# Patient Record
Sex: FEMALE | Race: WHITE | NOT HISPANIC OR LATINO | Employment: OTHER | URBAN - METROPOLITAN AREA
[De-identification: names, ages, dates, MRNs, and addresses within clinical notes are randomized per-mention and may not be internally consistent; named-entity substitution may affect disease eponyms.]

---

## 2019-07-16 ENCOUNTER — HOSPITAL ENCOUNTER (EMERGENCY)
Facility: HOSPITAL | Age: 64
Discharge: HOME | End: 2019-07-16
Payer: MEDICARE

## 2019-07-16 VITALS
DIASTOLIC BLOOD PRESSURE: 87 MMHG | RESPIRATION RATE: 20 BRPM | HEIGHT: 66 IN | HEART RATE: 90 BPM | BODY MASS INDEX: 21.86 KG/M2 | SYSTOLIC BLOOD PRESSURE: 146 MMHG | WEIGHT: 136 LBS | OXYGEN SATURATION: 97 % | TEMPERATURE: 98 F

## 2019-07-16 DIAGNOSIS — S00.03XA CONTUSION OF SCALP, INITIAL ENCOUNTER: ICD-10-CM

## 2019-07-16 DIAGNOSIS — S06.0X0A CONCUSSION WITHOUT LOSS OF CONSCIOUSNESS, INITIAL ENCOUNTER: Primary | ICD-10-CM

## 2019-07-16 PROCEDURE — 99284 EMERGENCY DEPT VISIT MOD MDM: CPT | Mod: 25

## 2019-07-16 PROCEDURE — 70450 CT HEAD WITHOUT CONTRAST: ICD-10-PCS | Mod: 26,,, | Performed by: RADIOLOGY

## 2019-07-16 PROCEDURE — 70450 CT HEAD/BRAIN W/O DYE: CPT | Mod: 26,,, | Performed by: RADIOLOGY

## 2019-07-16 PROCEDURE — 70450 CT HEAD/BRAIN W/O DYE: CPT | Mod: TC

## 2019-07-16 NOTE — DISCHARGE INSTRUCTIONS
Your blood pressure was elevated on exam today please follow up with pcp for blood pressure management.     May take OTC tylenol and needed for pain. Avoid NSAID containing products for 24 hours after the injury. May apply ice 15 min three times a day as needed for pain.    Follow up with PCP in 24-48 hours for reevaluation.

## 2019-07-16 NOTE — ED PROVIDER NOTES
Encounter Date: 7/16/2019       History     Chief Complaint   Patient presents with    Fall    Head Injury     Pt presents to the ER with dizziness post fall. Pt stated she was working at her house and tripped on a piece of raised concrete and hit her head on the water heater. Pt stated she was having a hard time walking for 1 hour post fall and stated now she can walk normal but stated she still has dizziness and on the spot where she hit her head she has pain. Pt stated she is also sore to her back denied radiation of pain denied having taken any medication for pain prior to arrival and stated she did not want anything for pain at this time. Pt stated she has not had this type of injury before.    The history is provided by the patient.     Review of patient's allergies indicates:  No Known Allergies  History reviewed. No pertinent past medical history.  Past Surgical History:   Procedure Laterality Date    APPENDECTOMY      CHOLECYSTECTOMY       History reviewed. No pertinent family history.  Social History     Tobacco Use    Smoking status: Never Smoker   Substance Use Topics    Alcohol use: Yes     Comment: occ    Drug use: Never     Review of Systems   Constitutional: Negative for fever.   HENT: Negative for sore throat.    Respiratory: Negative for shortness of breath.    Cardiovascular: Negative for chest pain.   Gastrointestinal: Negative for nausea.   Genitourinary: Negative for dysuria.   Musculoskeletal: Positive for back pain (Descried as sore), gait problem (Resolved prior to arrival in ER) and neck pain (Pain to both sides fo neck). Negative for neck stiffness.        Pain to right side of scalp    Skin: Negative for rash.   Neurological: Negative for weakness.   Hematological: Does not bruise/bleed easily.   All other systems reviewed and are negative.      Physical Exam     Initial Vitals [07/16/19 1137]   BP Pulse Resp Temp SpO2   (!) 146/87 90 20 98.3 °F (36.8 °C) 97 %      MAP       --          Physical Exam    Nursing note and vitals reviewed.  Constitutional: She appears well-developed and well-nourished. She is not diaphoretic. No distress.   HENT:   Head: Normocephalic and atraumatic.       Right Ear: External ear normal.   Left Ear: External ear normal.   Nose: Nose normal.   Mouth/Throat: Oropharynx is clear and moist. No oropharyngeal exudate.   No septal hematoma   Eyes: Conjunctivae and EOM are normal. Pupils are equal, round, and reactive to light. Right eye exhibits no discharge. Left eye exhibits no discharge.   Neck: Normal range of motion. Neck supple.   Cardiovascular: Normal rate, regular rhythm, normal heart sounds and intact distal pulses. Exam reveals no gallop and no friction rub.    No murmur heard.  Pulmonary/Chest: Breath sounds normal. No respiratory distress. She has no wheezes. She has no rhonchi. She has no rales. She exhibits no tenderness.   Musculoskeletal: Normal range of motion. She exhibits tenderness.   Strength 5/5 BUE and BLE   Neurological: She is alert and oriented to person, place, and time. She has normal strength. No cranial nerve deficit or sensory deficit. Coordination and gait normal. GCS score is 15. GCS eye subscore is 4. GCS verbal subscore is 5. GCS motor subscore is 6.   Skin: Skin is warm and dry. Capillary refill takes less than 2 seconds.   Psychiatric: She has a normal mood and affect. Thought content normal.         ED Course   Procedures  Labs Reviewed - No data to display       Imaging Results    None          Medical Decision Making:   Differential Diagnosis:   Concussion Contusion sprain strain closed head injury  Clinical Tests:   Radiological Study: Ordered and Reviewed  ED Management:  Due to patient being 63 [place] head CT criteria not able to rule out need for head CT will order CT head without contrast. Pt refused pain medication on initial assessment.    Discussed RTER precautions with the patient stated she understood and did not have  any questions.                      Clinical Impression:       ICD-10-CM ICD-9-CM   1. Concussion without loss of consciousness, initial encounter S06.0X0A 850.0   2. Contusion of scalp, initial encounter S00.03XA 920                                SANGEETA Owen  07/18/19 1224

## 2019-12-20 DIAGNOSIS — M54.2 NECK PAIN: Primary | ICD-10-CM

## 2020-01-06 ENCOUNTER — CLINICAL SUPPORT (OUTPATIENT)
Dept: REHABILITATION | Facility: HOSPITAL | Age: 65
End: 2020-01-06
Payer: MEDICARE

## 2020-01-06 DIAGNOSIS — M54.2 NECK PAIN, CHRONIC: Primary | ICD-10-CM

## 2020-01-06 DIAGNOSIS — M25.512 CHRONIC PAIN OF BOTH SHOULDERS: ICD-10-CM

## 2020-01-06 DIAGNOSIS — G89.29 CHRONIC PAIN OF BOTH SHOULDERS: ICD-10-CM

## 2020-01-06 DIAGNOSIS — G89.29 NECK PAIN, CHRONIC: Primary | ICD-10-CM

## 2020-01-06 DIAGNOSIS — M25.511 CHRONIC PAIN OF BOTH SHOULDERS: ICD-10-CM

## 2020-01-06 PROCEDURE — 97162 PT EVAL MOD COMPLEX 30 MIN: CPT | Mod: PN

## 2020-01-06 PROCEDURE — 97110 THERAPEUTIC EXERCISES: CPT | Mod: PN

## 2020-01-06 NOTE — PLAN OF CARE
[facility]  Physical Therapy Initial Evaluation    Name: Tracy Meyer  Clinic Number: [id number]    Therapy Diagnosis:   Encounter Diagnoses   Name Primary?    Neck pain, chronic Yes    Chronic pain of both shoulders      Physician: Nila Leung MD    Physician Orders: PT Eval and Treat   Medical Diagnosis: Neck Pain   Evaluation Date: 1/6/2020  Authorization period Expiration: 12/31/2020  Plan of Care Certification Period: 4/1/2020    Visit #: 1/ Visits authorized: 12  Time In:7:00 AM   Time Out: 8:00 AM   Total Billable Time: 60 minutes    Precautions: Standard    Subjective   Date of onset: December 2019   Date of Surgery: N/A     No past medical history on file. - history of depression; osteopenia; prior history of neck and shoulder pain with bilateral adhesive capsulitis   Tracy Meyer  has a past surgical history that includes Appendectomy and Cholecystectomy.    Tracy currently has no medications in their medication list.    Review of patient's allergies indicates:  No Known Allergies     Imaging, : no imaging noted     Prior Therapy: Tracy reported prior physical therapy treatment  for neck and shoulder pain; had dry needling also which she reports was helpful.   Social History: Patient lives in a single level home with 1 steps to enter   Occupation: retired Nurse Practitioner   Prior Level of Function: Independent PLOF; always limited cervical range of motion, but not as painful as it has been,   Current Level of Function: reports having increased problems with her right shoulder and arm - tingling in her 3rd,4th,5th fingers - elbow pain; thinks maybe it is carpal tunnel. She stated that she does her exercises everyday to keep her thoracic spine moving, still playing golf, but having trouble holding onto the golf club with her right hand; feels like she doesn't have the range in her back swing with her right shoulder.     Pain:  Current 6/10, worst 10/10, best 5/10   Location:  neck  right  Description: Burning, Tingling and Shooting  Aggravating Factors: moving my neck; performing my daily activities, playing golf   Easing Factors: rest      Onset/LAURA: gradual , although reports significant past history of neck, thoracic and shoulder pain     History of current condition - Tracy reports: recent exacerbation of neck, thoracic and right shoulder pain - about 1 month in duration; Tracy stated that she is having constant neck and bilateral upper trap pain; tingling into her right hand ( this is not new) but she feels like it is getting more frequent; Tracy wants to improve her range of motion, decrease her pain and get back to improved function.     Pts goals: To decrease pain and improve range of motion.         Objective     Observation: Tracy ambulated into clinic; upset over how long it took her to get a referral for therapy; stated she has been hurting for well over a month. Tracy is alert/oriented x 4; she reports stiffness in her neck, shoulders - history of frozen shoulders bilaterally     Posture:     -       flattend thoracic spine noted; slight rounding of shoulders     Cervical Range of Motion:    Degrees Pain   Flexion 21 Discomfort    Extension 12 Pain    Right Rotation 30 Pain    Left Rotation 41    Right Side Bending 10    Left Side Bending 6 Pain       Shoulder Range of Motion:   Shoulder Left Right   Flexion 160 160    Abduction 160 160   ER HBH to T3 HBH to T3   IR TUB to T8 TUB to T8     Strength:   Left Right   DNF Poor - less than 10 secs     Upper trap 4-/5 4-/5   Mid trap 3/5 3/5   Lower trap 3/5 3/5   Rhomboids 3+/5 3+/5    45,43,40 35,40,35     Upper Extremity Strength  (R) UE  (L) UE    Shoulder flexion: 5/5 Shoulder flexion: 5/5   Shoulder Abduction: 4/5 Shoulder abduction: 4/5   Shoulder ER 4+/5 Shoulder ER 4+/5   Shoulder IR 5/5 Shoulder IR 5/5   Elbow flexion: 5/5 Elbow flexion: 5/5   Elbow extension: 5/5 Elbow extension: 5/5   Wrist flexion: 4+/5 Wrist  flexion: 4+/5   Wrist extension: 4+/5 Wrist extension: 4+/5       Special Tests:  Distraction Negative   Compression Positive   Spurlings Negative   Sharp-Kasandra Negative   VA test Negative   Lateral Flexion Alar Ligament Negative     Upper Limb Neurodynamic testing:   Right  Left   BPNT Negative  Negative   UNT Negative  Negative   MNT Negative  Negative   RNT Negative  Negative         Joint Mobility:  Cervical - ,    PA's decreased,    Transverse glides decreased - significant loss of cervical mobility noted - especially OA extension; Right rotation, lateral SB to left     Thoracic mobility: restricted into extension and rotation     Palpation: moderate tenderness to palpation at bilateral upper traps; thoracic paraspinals     Sensation: intact to light touch; tingling in 3-5 fingers right hand     Flexibility:     Upper Trap = R moderate restriction, L moderate restriction   Scalenes: R moderate restriction, L moderate restriction   SCM: R minimal restriction, L minimal restriction   Levator Scap: R minimal restriction, L minimal restriction      PT Evaluation Completed? Yes  Discussed Plan of Care with patient: Yes    TREATMENT   Tracy received therapeutic exercises to develop ROM and flexibility for 15 minutes including:    Using 1/2 roll or smaller foam roller - horizontal at mid scapular - extend thoracic over this and perform bilateral shoulder flexion to open thoracic segments;   OA flexion/etxtension with head lift for upper segment motion and some early strengthening of DNF  Pectoralis and upper trap stretching - proper technique     Home Exercises and Patient Education Provided    Education provided re:   - progress towards goals   - role of therapy in multi - disciplinary team, goals for therapy  Pt educated on condition, POC, and expectations in therapy.  No spiritual or educational barriers to learning provided    Home exercises:  Pt will be provided HEP during course of treatment with progressions  as appropriate. Pt was advised to perform these exercises free of pain, and to stop performing them if pain occurs.   Tracy demonstrated good  understanding of the education provided.       Functional Limitations Reports - G Codes  Category: Mobility, Body position, Carrying, Self care, Other  Tool: UEFS and Neck Disability Index  Score: both are at 52% limitation       Assessment   Tracy is a 64 y.o. female referred to outpatient physical therapy and presents to PT with Cervical and upper thoracic pain.  Tracy has a chronic history of neck pain and loss of ROM; She has had Physical Therapy treatment in the past - just moved from [place] to [place] about 5 months ago and is in need of therapy again.  She demonstrates significant weakness in her deep neck flexor muscles and posterior scapula mm along with a loss of flexibility in her neck and upper back.  Patient demonstrates limitations as described in the problem list. Pt will benefit from physcial therapy services in order to maximize pain free and/or functional use of bilateral UE's and cervical/thoracic spine movement so that she can return to improved daily and recreational activities. . The following goals were discussed with the patient and patient is in agreement with them as to be addressed in the treatment plan.     Pt prognosis is Good.   Pt will benefit from skilled outpatient Physical Therapy to address the deficits stated above and in the chart below, provide pt/family education, and to maximize pt's level of independence.     Plan of care discussed with patient: Yes  Pt's spiritual, cultural and educational needs considered and pt agreeable to plan of care and goals as stated below:     Anticipated Barriers for therapy: none    Medical necessity is demonstrated by the following IMPAIRMENTS/PROBLEM LIST:    weakness, impaired sensation, impaired functional mobility, decreased upper extremity function, pain, decreased ROM, impaired joint  extensibility and impaired muscle length    GOALS:     Long Term Goals: 6 weeks  Pain: Decrease pain to no more than 3/10 to allow for improved ability to perform daily and recreational activities   Strength: Improve strength in deep neck flexor mm from able to hold x 10 secs to at least 20 secs and improve posterior scapula mm strength to at least 4+/5 for improved cervical stability  ROM: Improve ROM to 60% of normal limits   Functional scale: Improve score on UEFS and Neck disability index to <35% limitation   Lifting: Lift 25 lbs to waist level, 15 lbs to shoulder level, 10 lbs to overhead without pain or compensation  Postures: Increase sitting and/or standing duration to 60 mins  without pain   Transfers: Perform sit <> Stand and supine <> sit  transfers without increased pain or limitation  Exercise: demonstrate independence with home exercise program to maintain gains made in therapy.          Plan   Certification Period: 1/6/2020 to 4/6/2020.    Outpatient physical therapy 2 times weekly to include: Manual Therapy, Neuromuscular Re-ed, Patient Education and Therapeutic Exercise. Cont PT for 6 weeks.   Pt may be seen by PTA as part of the rehabilitation team.     I certify the need for these services furnished under this plan of treatment and while under my care.    Alexandra Mitchell, PT          Attestation:   I have seen the patient, reviewed the therapist's plan of care, and I agree with the plan of care.   I certify the need for these services furnished under this plan of treatment and while under my care.         _______________            ________                                               _____________________  Physician/Referring Practitioner                                                            Date of Signature

## 2020-01-10 ENCOUNTER — CLINICAL SUPPORT (OUTPATIENT)
Dept: REHABILITATION | Facility: HOSPITAL | Age: 65
End: 2020-01-10
Payer: MEDICARE

## 2020-01-10 DIAGNOSIS — G89.29 NECK PAIN, CHRONIC: Primary | ICD-10-CM

## 2020-01-10 DIAGNOSIS — M25.512 CHRONIC PAIN OF BOTH SHOULDERS: ICD-10-CM

## 2020-01-10 DIAGNOSIS — M54.2 NECK PAIN, CHRONIC: Primary | ICD-10-CM

## 2020-01-10 DIAGNOSIS — G89.29 CHRONIC PAIN OF BOTH SHOULDERS: ICD-10-CM

## 2020-01-10 DIAGNOSIS — M25.511 CHRONIC PAIN OF BOTH SHOULDERS: ICD-10-CM

## 2020-01-10 PROCEDURE — 97140 MANUAL THERAPY 1/> REGIONS: CPT | Mod: PN

## 2020-01-10 PROCEDURE — 97110 THERAPEUTIC EXERCISES: CPT | Mod: PN

## 2020-01-10 NOTE — PROGRESS NOTES
"                                                    Physical Therapy Daily Note     Name: Tracy Meyer  Clinic Number: [id number]  Diagnosis:   Encounter Diagnoses   Name Primary?    Neck pain, chronic Yes    Chronic pain of both shoulders      Physician: Nila Leung MD  Precautions: standard  Visit #: 2 of 12  PTA Visit #: 0  Time In: 7:00 AM   Time Out: 8:00 AM     Subjective     Pt reports: "I'm definitely better, I feel like I have more mobility"    Pain Scale: Tracy rates pain on a scale of 0-10 to be 4-5 currently.    Objective     Tracy received individual therapeutic exercises to develop strength, ROM, posture and core stabilization for 30 minutes including:  Chin tucks   Prone - thoracic/lumbar extension x 10  Prone - alternate arms x 10  Prone - "w's" x 10  Prone - bilateral shoulder abduction x 10  Prone - bilateral shoulder extension x 10 (palms up)  Supine - bilateral shoulder flexion over 1/2 roll x 15  Supine: scapular retractions - lying on 1/2 roll vertically   Supine - serratus lifts - vertical 1/2 roll     Tracy received the following manual therapy techniques: Joint mobilizations, Manual traction and Soft tissue Mobilization were applied to the: cervical and thoracic spine  for 30 minutes including:  Seated - STM to bilateral Upper traps with trigger point release on right ; Seated - MET to improve right cervical rotation and extension 4 reps ; Supine - cervical manual traction; OA extension; Facet mobilization from mid-tspine to cervical; Lateral gliding of C1/C2 to left for right rotation;  Prone - STM with tissue lifting bilateral upper traps and paraspinals through thoracic area;  Grade II mobilization into extension from T1 through T8;       Written Home Exercises Provided:   Tracy was given HEP on evaluation   Pt demo good understanding of the education provided. Tracy demonstrated good return demonstration of activities.     Education provided re:  Tracy verbalized good understanding of " education provided.   No spiritual or educational barriers to learning provided    Assessment     Patient tolerated treatment well; better able to turn head from side to side without increased pain;   This is a 64 y.o. female referred to outpatient physical therapy and presents with a medical diagnosis of cervicalgia and demonstrates limitations as described in the problem list. Pt prognosis is Good. Pt will continue to benefit from skilled outpatient physical therapy to address the deficits listed in the problem list, provide pt/family education and to maximize pt's level of independence in the home and community environment.     Goals as follows:    Long Term Goals: 6 weeks  Pain: Decrease pain to no more than 3/10 to allow for improved ability to perform daily and recreational activities   Strength: Improve strength in deep neck flexor mm from able to hold x 10 secs to at least 20 secs and improve posterior scapula mm strength to at least 4+/5 for improved cervical stability  ROM: Improve ROM to 60% of normal limits   Functional scale: Improve score on UEFS and Neck disability index to <35% limitation   Lifting: Lift 25 lbs to waist level, 15 lbs to shoulder level, 10 lbs to overhead without pain or compensation  Postures: Increase sitting and/or standing duration to 60 mins  without pain   Transfers: Perform sit <> Stand and supine <> sit  transfers without increased pain or limitation  Exercise: demonstrate independence with home exercise program to maintain gains made in therapy.          Plan     Continue with established Plan of Care towards PT goals.    Therapist: Alexandra Mitchell, PT  1/10/2020

## 2020-01-13 ENCOUNTER — CLINICAL SUPPORT (OUTPATIENT)
Dept: REHABILITATION | Facility: HOSPITAL | Age: 65
End: 2020-01-13
Payer: MEDICARE

## 2020-01-13 DIAGNOSIS — M54.2 NECK PAIN, CHRONIC: Primary | ICD-10-CM

## 2020-01-13 DIAGNOSIS — M25.512 CHRONIC PAIN OF BOTH SHOULDERS: ICD-10-CM

## 2020-01-13 DIAGNOSIS — G89.29 CHRONIC PAIN OF BOTH SHOULDERS: ICD-10-CM

## 2020-01-13 DIAGNOSIS — M25.511 CHRONIC PAIN OF BOTH SHOULDERS: ICD-10-CM

## 2020-01-13 DIAGNOSIS — G89.29 NECK PAIN, CHRONIC: Primary | ICD-10-CM

## 2020-01-13 PROCEDURE — 97140 MANUAL THERAPY 1/> REGIONS: CPT | Mod: PN

## 2020-01-13 PROCEDURE — 97010 HOT OR COLD PACKS THERAPY: CPT | Mod: PN

## 2020-01-13 NOTE — PROGRESS NOTES
"                                                    Physical Therapy Daily Note     Name: Tracy Meyer  Clinic Number: [id number]  Diagnosis:   Encounter Diagnoses   Name Primary?    Neck pain, chronic Yes    Chronic pain of both shoulders      Physician: Nila Leung MD  Precautions: standard  Visit #: 3 of 12  PTA Visit #: 0  Time In: 7:00 AM   Time Out: 8: 20 AM     Subjective     Pt reports: "I'm really hurting today, I have a lot of burring from my right ear down into my shoulder blade"   I started hurting on Friday evening after I left and it didn't really let up until Sunday.  I figured out that when I'm doing my chin tucks, I'm clenching my teeth and I think this is causing some of my pain on the right side.   Pain Scale: Tracy rates pain on a scale of 0-10 to be 8 currently.    Objective     Tracy received individual therapeutic exercises to develop strength, ROM, posture and core stabilization for 30 minutes including:  Chin tucks   S/L thoracic stretches   Prone - thoracic/lumbar extension x 10  Prone - alternate arms x 10 - did not perform   Prone - "w's" x 10  Prone - bilateral shoulder abduction x 10  Prone - bilateral shoulder extension x 10 (palms up)  Supine - bilateral shoulder flexion over 1/2 roll x 15 - used smaller blue roll   Supine: scapular retractions - lying on 1/2 roll vertically - used smaller blue roll   Supine - serratus lifts - vertical 1/2 roll  - used smaller blue roll     Tracy received the following manual therapy techniques: Joint mobilizations, Manual traction and Soft tissue Mobilization were applied to the: cervical and thoracic spine  for 30 minutes including:  Seated - IASTM to bilateral Upper traps with trigger point release on right ; Supine - cervical manual traction; OA extension; Facet mobilization from mid-tspine to cervical; Lateral gliding of C1/C2 to left for right rotation;  Prone - STM with tissue lifting bilateral upper traps and paraspinals through thoracic " area;  Grade II mobilization into extension from T1 through T8;     Provided moist heat to bilateral upper traps, neck and posterior shoulder/scapula areas following manual treatment today x 18 mins.     Written Home Exercises Provided:   Tracy was given HEP on evaluation - concentrate on neck retractions - put tongue on roof of mouth to prevent clenching teeth; S/L thoracic stretches; Prone abductions and extensions - no flexion in prone right now.   Pt demo good understanding of the education provided. Tracy demonstrated good return demonstration of activities.     Education provided re:  Tracy verbalized good understanding of education provided.   No spiritual or educational barriers to learning provided    Assessment     Patient tolerated treatment well; focused on fascia release and tissue stretching today; ROM exercises without any weight.   This is a 64 y.o. female referred to outpatient physical therapy and presents with a medical diagnosis of cervicalgia and demonstrates limitations as described in the problem list. Pt prognosis is Good. Pt will continue to benefit from skilled outpatient physical therapy to address the deficits listed in the problem list, provide pt/family education and to maximize pt's level of independence in the home and community environment.     Goals as follows:    Long Term Goals: 6 weeks  Pain: Decrease pain to no more than 3/10 to allow for improved ability to perform daily and recreational activities   Strength: Improve strength in deep neck flexor mm from able to hold x 10 secs to at least 20 secs and improve posterior scapula mm strength to at least 4+/5 for improved cervical stability  ROM: Improve ROM to 60% of normal limits   Functional scale: Improve score on UEFS and Neck disability index to <35% limitation   Lifting: Lift 25 lbs to waist level, 15 lbs to shoulder level, 10 lbs to overhead without pain or compensation  Postures: Increase sitting and/or standing duration to  60 mins  without pain   Transfers: Perform sit <> Stand and supine <> sit  transfers without increased pain or limitation  Exercise: demonstrate independence with home exercise program to maintain gains made in therapy.          Plan     Continue with established Plan of Care towards PT goals.    Therapist: Alexandra Mitchell, PT  1/13/2020

## 2020-01-17 ENCOUNTER — CLINICAL SUPPORT (OUTPATIENT)
Dept: REHABILITATION | Facility: HOSPITAL | Age: 65
End: 2020-01-17
Payer: MEDICARE

## 2020-01-17 DIAGNOSIS — M54.2 NECK PAIN, CHRONIC: Primary | ICD-10-CM

## 2020-01-17 DIAGNOSIS — M25.512 CHRONIC PAIN OF BOTH SHOULDERS: ICD-10-CM

## 2020-01-17 DIAGNOSIS — G89.29 CHRONIC PAIN OF BOTH SHOULDERS: ICD-10-CM

## 2020-01-17 DIAGNOSIS — G89.29 NECK PAIN, CHRONIC: Primary | ICD-10-CM

## 2020-01-17 DIAGNOSIS — M25.511 CHRONIC PAIN OF BOTH SHOULDERS: ICD-10-CM

## 2020-01-17 PROCEDURE — 97110 THERAPEUTIC EXERCISES: CPT | Mod: PN

## 2020-01-17 PROCEDURE — 97140 MANUAL THERAPY 1/> REGIONS: CPT | Mod: PN

## 2020-01-17 NOTE — PROGRESS NOTES
"                                                    Physical Therapy Daily Note     Name: Tracy Meyer  Clinic Number: [id number]  Diagnosis:   Encounter Diagnoses   Name Primary?    Chronic pain of both shoulders     Neck pain, chronic Yes     Physician: Nila Leung MD  Precautions: standard  Visit #: 4 of 12  PTA Visit #: 0  Time In: 7:00 AM   Time Out: 8:00 AM     Subjective      Pt reports:  "I'm so much better this morning, I just have alittle pain on the right side of my head into my shoulder blade   Pain Scale: Tracy rates pain on a scale of 0-10 to be 3-4 currently.    Objective     Tracy received individual therapeutic exercises to develop strength, ROM, posture and core stabilization for 30 minutes including:  Chin tucks   S/L thoracic stretches   Prone - thoracic/lumbar extension x 10  Prone - alternate arms x 10   Prone - "w's" x 10  Prone - bilateral shoulder abduction x 10  Prone - bilateral shoulder extension x 10 (palms up)  Supine - bilateral shoulder flexion over 1/2 roll x 15 - used smaller blue roll   Supine: scapular retractions - lying on 1/2 roll vertically - used smaller blue roll   Supine - serratus lifts - vertical 1/2 roll  - used smaller blue roll     Tracy received the following manual therapy techniques: Joint mobilizations, Manual traction and Soft tissue Mobilization were applied to the: cervical and thoracic spine  for 30 minutes including:  Seated - IASTM to bilateral Upper traps with trigger point release on right ; Supine - cervical manual traction; OA extension; Facet mobilization from mid-tspine to cervical; Lateral gliding of C1/C2 to left for right rotation;  Prone - STM with tissue lifting bilateral upper traps and paraspinals through thoracic area;  Grade II mobilization into extension from T1 through T8;     Provided moist heat to bilateral upper traps, neck and posterior shoulder/scapula areas following manual treatment today x 18 mins.     Written Home Exercises " Provided:   Tracy was given HEP on evaluation - concentrate on neck retractions - put tongue on roof of mouth to prevent clenching teeth; S/L thoracic stretches; Prone abductions and extensions - no flexion in prone right now.   Pt demo good understanding of the education provided. Tracy demonstrated good return demonstration of activities.     Education provided re:  Tracy verbalized good understanding of education provided.   No spiritual or educational barriers to learning provided    Assessment     Patient tolerated treatment well; focused on fascia release and tissue stretching today; ROM exercises without any weight.   This is a 64 y.o. female referred to outpatient physical therapy and presents with a medical diagnosis of cervicalgia and demonstrates limitations as described in the problem list. Pt prognosis is Good. Pt will continue to benefit from skilled outpatient physical therapy to address the deficits listed in the problem list, provide pt/family education and to maximize pt's level of independence in the home and community environment.     Goals as follows:    Long Term Goals: 6 weeks  Pain: Decrease pain to no more than 3/10 to allow for improved ability to perform daily and recreational activities   Strength: Improve strength in deep neck flexor mm from able to hold x 10 secs to at least 20 secs and improve posterior scapula mm strength to at least 4+/5 for improved cervical stability  ROM: Improve ROM to 60% of normal limits   Functional scale: Improve score on UEFS and Neck disability index to <35% limitation   Lifting: Lift 25 lbs to waist level, 15 lbs to shoulder level, 10 lbs to overhead without pain or compensation  Postures: Increase sitting and/or standing duration to 60 mins  without pain   Transfers: Perform sit <> Stand and supine <> sit  transfers without increased pain or limitation  Exercise: demonstrate independence with home exercise program to maintain gains made in therapy.           Plan     Continue with established Plan of Care towards PT goals.    Therapist: Alexandra Mitchell, PT  1/17/2020

## 2020-01-20 ENCOUNTER — CLINICAL SUPPORT (OUTPATIENT)
Dept: REHABILITATION | Facility: HOSPITAL | Age: 65
End: 2020-01-20
Payer: MEDICARE

## 2020-01-20 DIAGNOSIS — G89.29 NECK PAIN, CHRONIC: Primary | ICD-10-CM

## 2020-01-20 DIAGNOSIS — M25.512 CHRONIC PAIN OF BOTH SHOULDERS: ICD-10-CM

## 2020-01-20 DIAGNOSIS — G89.29 CHRONIC PAIN OF BOTH SHOULDERS: ICD-10-CM

## 2020-01-20 DIAGNOSIS — M25.511 CHRONIC PAIN OF BOTH SHOULDERS: ICD-10-CM

## 2020-01-20 DIAGNOSIS — M54.2 NECK PAIN, CHRONIC: Primary | ICD-10-CM

## 2020-01-20 PROCEDURE — 97110 THERAPEUTIC EXERCISES: CPT | Mod: PN

## 2020-01-20 PROCEDURE — 97140 MANUAL THERAPY 1/> REGIONS: CPT | Mod: PN

## 2020-01-20 NOTE — PROGRESS NOTES
"                                                    Physical Therapy Daily Note     Name: Tracy Meyer  Clinic Number: [id number]  Diagnosis:   Encounter Diagnoses   Name Primary?    Neck pain, chronic Yes    Chronic pain of both shoulders      Physician: Nila Leung MD  Precautions: standard  Visit #: 5 of 12  PTA Visit #: 0  Time In:  10:00 AM    Time Out: 11:00 AM     Subjective      Pt reports:  "I'm so much better this morning, I just have a little pain at the top of my shoulder running into my head, but nothing too bad.   Pain Scale: Tracy rates pain on a scale of 0-10 to be 3-4 currently.    Objective     Tracy received individual therapeutic exercises to develop strength, ROM, posture and core stabilization for 30 minutes including:  Chin tucks   S/L thoracic stretches   Prone - thoracic/lumbar extension x 10  Prone - alternate arms x 10   Prone - "w's" x 10  Prone - bilateral shoulder abduction x 10  Prone - bilateral shoulder extension x 10 (palms up)  Supine - bilateral shoulder flexion over 1/2 roll x 15 - used smaller blue roll   Supine: scapular retractions - lying on 1/2 roll vertically - used smaller blue roll   Supine - serratus lifts - vertical 1/2 roll  - used smaller blue roll     Tracy received the following manual therapy techniques: Joint mobilizations, Manual traction and Soft tissue Mobilization were applied to the: cervical and thoracic spine  for 30 minutes including:  Seated - IASTM to bilateral Upper traps with trigger point release on right ; Supine - cervical manual traction; OA extension; Facet mobilization from mid-tspine to cervical; Lateral gliding of C1/C2 to left for right rotation;  Prone - STM with tissue lifting bilateral upper traps and paraspinals through thoracic area;  Grade II mobilization into extension from T1 through T8;         Written Home Exercises Provided:   Tracy was given HEP on evaluation - concentrate on neck retractions - put tongue on roof of mouth to " prevent clenching teeth; S/L thoracic stretches; Prone abductions and extensions - no flexion in prone right now.   Pt demo good understanding of the education provided. Tracy demonstrated good return demonstration of activities.     Education provided re:  Tracy verbalized good understanding of education provided.   No spiritual or educational barriers to learning provided    Assessment     Patient tolerated treatment well; focused on fascia release and tissue stretching today; ROM exercises without any weight.   This is a 64 y.o. female referred to outpatient physical therapy and presents with a medical diagnosis of cervicalgia and demonstrates limitations as described in the problem list. Pt prognosis is Good. Pt will continue to benefit from skilled outpatient physical therapy to address the deficits listed in the problem list, provide pt/family education and to maximize pt's level of independence in the home and community environment.     Goals as follows:    Long Term Goals: 6 weeks  Pain: Decrease pain to no more than 3/10 to allow for improved ability to perform daily and recreational activities   Strength: Improve strength in deep neck flexor mm from able to hold x 10 secs to at least 20 secs and improve posterior scapula mm strength to at least 4+/5 for improved cervical stability  ROM: Improve ROM to 60% of normal limits   Functional scale: Improve score on UEFS and Neck disability index to <35% limitation   Lifting: Lift 25 lbs to waist level, 15 lbs to shoulder level, 10 lbs to overhead without pain or compensation  Postures: Increase sitting and/or standing duration to 60 mins  without pain   Transfers: Perform sit <> Stand and supine <> sit  transfers without increased pain or limitation  Exercise: demonstrate independence with home exercise program to maintain gains made in therapy.          Plan     Continue with established Plan of Care towards PT goals.    Therapist: Alexandra Mitchell, PT  1/20/2020

## 2020-01-22 ENCOUNTER — CLINICAL SUPPORT (OUTPATIENT)
Dept: REHABILITATION | Facility: HOSPITAL | Age: 65
End: 2020-01-22
Payer: MEDICARE

## 2020-01-22 DIAGNOSIS — M25.512 CHRONIC PAIN OF BOTH SHOULDERS: ICD-10-CM

## 2020-01-22 DIAGNOSIS — M54.2 NECK PAIN, CHRONIC: Primary | ICD-10-CM

## 2020-01-22 DIAGNOSIS — M25.511 CHRONIC PAIN OF BOTH SHOULDERS: ICD-10-CM

## 2020-01-22 DIAGNOSIS — G89.29 CHRONIC PAIN OF BOTH SHOULDERS: ICD-10-CM

## 2020-01-22 DIAGNOSIS — G89.29 NECK PAIN, CHRONIC: Primary | ICD-10-CM

## 2020-01-22 PROCEDURE — 97140 MANUAL THERAPY 1/> REGIONS: CPT | Mod: PN

## 2020-01-22 PROCEDURE — 97110 THERAPEUTIC EXERCISES: CPT | Mod: PN

## 2020-01-22 NOTE — PROGRESS NOTES
"                                                    Physical Therapy Daily Note     Name: Tracy Meyer  Clinic Number: [id number]  Diagnosis:   Encounter Diagnoses   Name Primary?    Chronic pain of both shoulders     Neck pain, chronic Yes     Physician: Nila Leung MD  Precautions: standard  Visit #: 6 of 12  PTA Visit #: 0  Time In:  3:00 PM  Time Out:  4:00 PM      Subjective      Pt reports:  "I'm feeling really good, I have just a little pain right at the base of my head on the right side and in my right tricep   Pain Scale: Tracy rates pain on a scale of 0-10 to be 2-3 currently.    Objective     Tracy received individual therapeutic exercises to develop strength, ROM, posture and core stabilization for 30 minutes including:  Chin tucks   S/L thoracic stretches   Prone - thoracic/lumbar extension x 10  Prone - alternate arms x 10   Prone - "w's" x 10  Prone - bilateral shoulder abduction x 10  Prone - bilateral shoulder extension x 10 (palms up)  Supine - bilateral shoulder flexion over 1/2 roll x 15 - used smaller blue roll   Supine: scapular retractions - lying on 1/2 roll vertically - used smaller blue roll   Supine - serratus lifts - vertical 1/2 roll  - used smaller blue roll     Tracy received the following manual therapy techniques: Joint mobilizations, Manual traction and Soft tissue Mobilization were applied to the: cervical and thoracic spine  for 30 minutes including:  Seated - STM to bilateral Upper traps with trigger point release on right ; Supine - cervical manual traction; OA extension; Facet mobilization from mid-tspine to cervical; Lateral gliding of C1/C2 to left for right rotation;  Prone - STM with tissue lifting bilateral upper traps and paraspinals through thoracic area;  Grade II mobilization into extension from T1 through T8;         Written Home Exercises Provided:   Tracy was given HEP on evaluation - concentrate on neck retractions - put tongue on roof of mouth to prevent " clenching teeth; S/L thoracic stretches; Prone abductions and extensions - no flexion in prone right now.   Pt demo good understanding of the education provided. Tracy demonstrated good return demonstration of activities.     Education provided re:  Tracy verbalized good understanding of education provided.   No spiritual or educational barriers to learning provided    Assessment     Patient tolerated treatment well; focused on fascia release and tissue stretching today; ROM exercises without any weight. Backing off on the weights and use of a smaller roll for addressing extension in T-spine has been successful in reducing Tracy's pain as well as improving her flexibility.   This is a 64 y.o. female referred to outpatient physical therapy and presents with a medical diagnosis of cervicalgia and demonstrates limitations as described in the problem list. Pt prognosis is Good. Pt will continue to benefit from skilled outpatient physical therapy to address the deficits listed in the problem list, provide pt/family education and to maximize pt's level of independence in the home and community environment.     Goals as follows:    Long Term Goals: 6 weeks  Pain: Decrease pain to no more than 3/10 to allow for improved ability to perform daily and recreational activities   Strength: Improve strength in deep neck flexor mm from able to hold x 10 secs to at least 20 secs and improve posterior scapula mm strength to at least 4+/5 for improved cervical stability  ROM: Improve ROM to 60% of normal limits   Functional scale: Improve score on UEFS and Neck disability index to <35% limitation   Lifting: Lift 25 lbs to waist level, 15 lbs to shoulder level, 10 lbs to overhead without pain or compensation  Postures: Increase sitting and/or standing duration to 60 mins  without pain   Transfers: Perform sit <> Stand and supine <> sit  transfers without increased pain or limitation  Exercise: demonstrate independence with home  exercise program to maintain gains made in therapy.          Plan     Continue with established Plan of Care towards PT goals.    Therapist: Alexandra Mitchell, PT  1/22/2020

## 2020-01-27 ENCOUNTER — CLINICAL SUPPORT (OUTPATIENT)
Dept: REHABILITATION | Facility: HOSPITAL | Age: 65
End: 2020-01-27
Payer: MEDICARE

## 2020-01-27 DIAGNOSIS — M25.511 CHRONIC PAIN OF BOTH SHOULDERS: Primary | ICD-10-CM

## 2020-01-27 DIAGNOSIS — G89.29 NECK PAIN, CHRONIC: ICD-10-CM

## 2020-01-27 DIAGNOSIS — G89.29 CHRONIC PAIN OF BOTH SHOULDERS: Primary | ICD-10-CM

## 2020-01-27 DIAGNOSIS — M25.512 CHRONIC PAIN OF BOTH SHOULDERS: Primary | ICD-10-CM

## 2020-01-27 DIAGNOSIS — M54.2 NECK PAIN, CHRONIC: ICD-10-CM

## 2020-01-27 PROCEDURE — 97140 MANUAL THERAPY 1/> REGIONS: CPT | Mod: PN

## 2020-01-27 PROCEDURE — 97110 THERAPEUTIC EXERCISES: CPT | Mod: PN

## 2020-01-27 NOTE — PROGRESS NOTES
"                                                    Physical Therapy Daily Note     Name: Tracy Meyer  Clinic Number: [id number]  Diagnosis:   Encounter Diagnoses   Name Primary?    Chronic pain of both shoulders Yes    Neck pain, chronic      Physician: Nila Leung MD  Precautions: standard  Visit #: 7 of 12  PTA Visit #: 0  Time In:  7:00 AM  Time Out:  8:00 AM     Subjective      Pt reports:  "I'm feeling really good, I just feel stiff   Pain Scale: Tracy rates pain on a scale of 0-10 to be 0-1 currently.    Objective     Tracy received individual therapeutic exercises to develop strength, ROM, posture and core stabilization for 30 minutes including:  Chin tucks   S/L thoracic stretches   Prone - thoracic/lumbar extension x 10  Prone - alternate arms x 10   Prone - "w's" x 10  Prone - bilateral shoulder abduction x 10  Prone - bilateral shoulder extension x 10 (palms up)  Supine - bilateral shoulder flexion over 1/2 roll x 15 - used smaller blue roll   Supine: scapular retractions - lying on 1/2 roll vertically - used smaller blue roll   Supine - serratus lifts - vertical 1/2 roll  - used smaller blue roll     Tracy received the following manual therapy techniques: Joint mobilizations, Manual traction and Soft tissue Mobilization were applied to the: cervical and thoracic spine  for 30 minutes including:  Seated - STM to bilateral Upper traps with trigger point release on right ; Supine - cervical manual traction; OA extension; Facet mobilization from mid-tspine to cervical; Lateral gliding of C1/C2 to left for right rotation;  Prone - STM with tissue lifting bilateral upper traps and paraspinals through thoracic area;  Grade II mobilization into extension from T1 through T8;         Written Home Exercises Provided:   Tracy was given HEP on evaluation - concentrate on neck retractions - put tongue on roof of mouth to prevent clenching teeth; S/L thoracic stretches; Prone abductions and extensions - no flexion " in prone right now.   Pt demo good understanding of the education provided. Tracy demonstrated good return demonstration of activities.     Education provided re:  Tracy verbalized good understanding of education provided.   No spiritual or educational barriers to learning provided    Assessment     Patient tolerated treatment well; focused on fascia release and tissue stretching today; ROM exercises without any weight. Backing off on the weights and use of a smaller roll for addressing extension in T-spine has been successful in reducing Tracy's pain as well as improving her flexibility.   This is a 64 y.o. female referred to outpatient physical therapy and presents with a medical diagnosis of cervicalgia and demonstrates limitations as described in the problem list. Pt prognosis is Good. Pt will continue to benefit from skilled outpatient physical therapy to address the deficits listed in the problem list, provide pt/family education and to maximize pt's level of independence in the home and community environment.     Goals as follows:    Long Term Goals: 6 weeks  Pain: Decrease pain to no more than 3/10 to allow for improved ability to perform daily and recreational activities   Strength: Improve strength in deep neck flexor mm from able to hold x 10 secs to at least 20 secs and improve posterior scapula mm strength to at least 4+/5 for improved cervical stability  ROM: Improve ROM to 60% of normal limits   Functional scale: Improve score on UEFS and Neck disability index to <35% limitation   Lifting: Lift 25 lbs to waist level, 15 lbs to shoulder level, 10 lbs to overhead without pain or compensation  Postures: Increase sitting and/or standing duration to 60 mins  without pain   Transfers: Perform sit <> Stand and supine <> sit  transfers without increased pain or limitation  Exercise: demonstrate independence with home exercise program to maintain gains made in therapy.          Plan     Continue with  established Plan of Care towards PT goals.    Therapist: Alexandra Mitchell, PT  1/27/2020

## 2020-01-29 ENCOUNTER — CLINICAL SUPPORT (OUTPATIENT)
Dept: REHABILITATION | Facility: HOSPITAL | Age: 65
End: 2020-01-29
Payer: MEDICARE

## 2020-01-29 DIAGNOSIS — M25.512 CHRONIC PAIN OF BOTH SHOULDERS: ICD-10-CM

## 2020-01-29 DIAGNOSIS — G89.29 CHRONIC PAIN OF BOTH SHOULDERS: ICD-10-CM

## 2020-01-29 DIAGNOSIS — M54.2 NECK PAIN, CHRONIC: Primary | ICD-10-CM

## 2020-01-29 DIAGNOSIS — M25.511 CHRONIC PAIN OF BOTH SHOULDERS: ICD-10-CM

## 2020-01-29 DIAGNOSIS — G89.29 NECK PAIN, CHRONIC: Primary | ICD-10-CM

## 2020-01-29 PROCEDURE — 97110 THERAPEUTIC EXERCISES: CPT | Mod: PN

## 2020-01-29 PROCEDURE — 97140 MANUAL THERAPY 1/> REGIONS: CPT | Mod: PN

## 2020-01-29 NOTE — PROGRESS NOTES
"                                                    Physical Therapy Daily Note     Name: Tracy Meyer  Clinic Number: [id number]  Diagnosis:   Encounter Diagnoses   Name Primary?    Neck pain, chronic Yes    Chronic pain of both shoulders      Physician: Nlia Leung MD  Precautions: standard  Visit #: 8 of 12  PTA Visit #: 0  Time In:   2:00 PM   Time Out:  3:00 PM      Subjective      Pt reports:  "I'm feeling really good, I just feel stiff.   Pain Scale: Tracy rates pain on a scale of 0-10 to be 0-1 currently.    Objective     Tracy received individual therapeutic exercises to develop strength, ROM, posture and core stabilization for 30 minutes including:  Chin tucks   S/L thoracic stretches   Prone - thoracic/lumbar extension x 10  Prone - alternate arms x 10   Prone - "w's" x 10  Prone - bilateral shoulder abduction x 10  Prone - bilateral shoulder extension x 10 (palms up)  Supine - bilateral shoulder flexion over 1/2 roll x 15 - used smaller blue roll   Supine: scapular retractions - lying on 1/2 roll vertically - used smaller blue roll   Supine - serratus lifts - vertical 1/2 roll  - used smaller blue roll     Tracy received the following manual therapy techniques: Joint mobilizations, Manual traction and Soft tissue Mobilization were applied to the: cervical and thoracic spine  for 30 minutes including:  Seated - STM to bilateral Upper traps with trigger point release on right ; Supine - cervical manual traction; OA extension; Facet mobilization from mid-tspine to cervical; Lateral gliding of C1/C2 to left for right rotation;  Prone - STM with tissue lifting bilateral upper traps and paraspinals through thoracic area;  Grade II mobilization into extension from T1 through T8;         Written Home Exercises Provided:   Tracy was given HEP on evaluation - concentrate on neck retractions - put tongue on roof of mouth to prevent clenching teeth; S/L thoracic stretches; Prone abductions and extensions - no " flexion in prone right now.   Pt demo good understanding of the education provided. Tracy demonstrated good return demonstration of activities.     Education provided re:  Tracy verbalized good understanding of education provided.   No spiritual or educational barriers to learning provided    Assessment     Patient tolerated treatment well; focused on fascia release and tissue stretching today; ROM exercises without any weight. Backing off on the weights and use of a smaller roll for addressing extension in T-spine has been successful in reducing Tracy's pain as well as improving her flexibility.   This is a 64 y.o. female referred to outpatient physical therapy and presents with a medical diagnosis of cervicalgia and demonstrates limitations as described in the problem list. Pt prognosis is Good. Pt will continue to benefit from skilled outpatient physical therapy to address the deficits listed in the problem list, provide pt/family education and to maximize pt's level of independence in the home and community environment.     Goals as follows:    Long Term Goals: 6 weeks  Pain: Decrease pain to no more than 3/10 to allow for improved ability to perform daily and recreational activities   Strength: Improve strength in deep neck flexor mm from able to hold x 10 secs to at least 20 secs and improve posterior scapula mm strength to at least 4+/5 for improved cervical stability  ROM: Improve ROM to 60% of normal limits   Functional scale: Improve score on UEFS and Neck disability index to <35% limitation   Lifting: Lift 25 lbs to waist level, 15 lbs to shoulder level, 10 lbs to overhead without pain or compensation  Postures: Increase sitting and/or standing duration to 60 mins  without pain   Transfers: Perform sit <> Stand and supine <> sit  transfers without increased pain or limitation  Exercise: demonstrate independence with home exercise program to maintain gains made in therapy.          Plan     Continue with  established Plan of Care towards PT goals.    Therapist: Alexandra Mitchell, PT  1/29/2020

## 2020-02-03 ENCOUNTER — CLINICAL SUPPORT (OUTPATIENT)
Dept: REHABILITATION | Facility: HOSPITAL | Age: 65
End: 2020-02-03
Payer: MEDICARE

## 2020-02-03 DIAGNOSIS — M54.2 NECK PAIN, CHRONIC: Primary | ICD-10-CM

## 2020-02-03 DIAGNOSIS — M25.511 CHRONIC PAIN OF BOTH SHOULDERS: ICD-10-CM

## 2020-02-03 DIAGNOSIS — M25.512 CHRONIC PAIN OF BOTH SHOULDERS: ICD-10-CM

## 2020-02-03 DIAGNOSIS — G89.29 NECK PAIN, CHRONIC: Primary | ICD-10-CM

## 2020-02-03 DIAGNOSIS — G89.29 CHRONIC PAIN OF BOTH SHOULDERS: ICD-10-CM

## 2020-02-03 PROCEDURE — 97140 MANUAL THERAPY 1/> REGIONS: CPT | Mod: PN

## 2020-02-03 PROCEDURE — 97110 THERAPEUTIC EXERCISES: CPT | Mod: PN

## 2020-02-03 NOTE — PROGRESS NOTES
"                                                    Physical Therapy Daily Note     Name: Tracy Meyer  Clinic Number: [id number]  Diagnosis:   Encounter Diagnoses   Name Primary?    Chronic pain of both shoulders     Neck pain, chronic Yes     Physician: Nila Leung MD  Precautions: standard  Visit #: 9 of 12  PTA Visit #: 0  Time In:   7:00 AM   Time Out:   7:45 AM     Subjective     Pt reports:    My neck and shoulders are really tight this morning; I spent the night in the hospital Friday - didn't get much sleep at all; Tracy reported that her  continues to have problems - having runs of SVT and irregular rhythms -   Pain Scale: Tracy rates pain on a scale of 0-10 to be  3-4 currently.    Objective     Tracy received individual therapeutic exercises to develop strength, ROM, posture and core stabilization for 30 minutes including:  Chin tucks   S/L thoracic stretches   Prone - thoracic/lumbar extension x 10  Prone - alternate arms x 10   Prone - "w's" x 10  Prone - bilateral shoulder abduction x 10  Prone - bilateral shoulder extension x 10 (palms up)  Supine - bilateral shoulder flexion over 1/2 roll x 15 - used smaller blue roll   Supine: scapular retractions - lying on 1/2 roll vertically - used smaller blue roll   Supine - serratus lifts - vertical 1/2 roll  - used smaller blue roll     Tracy received the following manual therapy techniques: Joint mobilizations, Manual traction and Soft tissue Mobilization were applied to the: cervical and thoracic spine  for 30 minutes including:  Seated - STM to bilateral Upper traps with trigger point release on right ; Supine - cervical manual traction; OA extension; Facet mobilization from mid-tspine to cervical; Lateral gliding of C1/C2 to left for right rotation;  Prone - STM with tissue lifting bilateral upper traps and paraspinals through thoracic area;  Grade II mobilization into extension from T1 through T8;         Written Home Exercises Provided: "   Tracy was given HEP on evaluation - concentrate on neck retractions - put tongue on roof of mouth to prevent clenching teeth; S/L thoracic stretches; Prone abductions and extensions - no flexion in prone right now.   Pt demo good understanding of the education provided. Tracy demonstrated good return demonstration of activities.     Education provided re:  Tracy verbalized good understanding of education provided.   No spiritual or educational barriers to learning provided    Assessment     Patient tolerated treatment well; focused on fascia release and tissue stretching today; noted improvement in pain and movement after manual treatment today. Trayc continues to be independent with HEP.   This is a 64 y.o. female referred to outpatient physical therapy and presents with a medical diagnosis of cervicalgia and demonstrates limitations as described in the problem list. Pt prognosis is Good. Pt will continue to benefit from skilled outpatient physical therapy to address the deficits listed in the problem list, provide pt/family education and to maximize pt's level of independence in the home and community environment.     Goals as follows:    Long Term Goals: 6 weeks  Pain: Decrease pain to no more than 3/10 to allow for improved ability to perform daily and recreational activities   Strength: Improve strength in deep neck flexor mm from able to hold x 10 secs to at least 20 secs and improve posterior scapula mm strength to at least 4+/5 for improved cervical stability  ROM: Improve ROM to 60% of normal limits   Functional scale: Improve score on UEFS and Neck disability index to <35% limitation   Lifting: Lift 25 lbs to waist level, 15 lbs to shoulder level, 10 lbs to overhead without pain or compensation  Postures: Increase sitting and/or standing duration to 60 mins  without pain   Transfers: Perform sit <> Stand and supine <> sit  transfers without increased pain or limitation  Exercise: demonstrate independence  with home exercise program to maintain gains made in therapy.          Plan     Continue with established Plan of Care towards PT goals.    Therapist: Alexandra Mitchell, PT  2/3/2020

## 2020-02-10 ENCOUNTER — CLINICAL SUPPORT (OUTPATIENT)
Dept: REHABILITATION | Facility: HOSPITAL | Age: 65
End: 2020-02-10
Payer: MEDICARE

## 2020-02-10 DIAGNOSIS — M25.512 CHRONIC PAIN OF BOTH SHOULDERS: ICD-10-CM

## 2020-02-10 DIAGNOSIS — M25.511 CHRONIC PAIN OF BOTH SHOULDERS: ICD-10-CM

## 2020-02-10 DIAGNOSIS — G89.29 NECK PAIN, CHRONIC: Primary | ICD-10-CM

## 2020-02-10 DIAGNOSIS — M54.2 NECK PAIN, CHRONIC: Primary | ICD-10-CM

## 2020-02-10 DIAGNOSIS — G89.29 CHRONIC PAIN OF BOTH SHOULDERS: ICD-10-CM

## 2020-02-10 PROCEDURE — 97140 MANUAL THERAPY 1/> REGIONS: CPT | Mod: PN

## 2020-02-10 NOTE — PROGRESS NOTES
"                                                    Physical Therapy Daily Note     Name: Tracy Meyer  Clinic Number: [id number]  Diagnosis:   Encounter Diagnoses   Name Primary?    Chronic pain of both shoulders     Neck pain, chronic Yes     Physician: Nila Leung MD  Precautions: standard  Visit #: 10 of 12  PTA Visit #: 0  Time In:   7:00 AM   Time Out:  8:05 AM     Subjective     Pt reports:    I'm doing better, just a little sore on my right side - especially when I look up - I can't look up to reach for something for more than just a few seconds - it just hurts so much.   Pain Scale: Tracy rates pain on a scale of 0-10 to be  3-4 currently.    Objective     Tracy received individual therapeutic exercises to develop strength, ROM, posture and core stabilization for 30 minutes including:  Chin tucks   S/L thoracic stretches   Prone - thoracic/lumbar extension x 10  Prone - alternate arms x 10   Prone - "w's" x 10  Prone - bilateral shoulder abduction x 10  Prone - bilateral shoulder extension x 10 (palms up)  Supine - bilateral shoulder flexion over 1/2 roll x 15 - used smaller blue roll   Supine: scapular retractions - lying on 1/2 roll vertically - used smaller blue roll   Supine - serratus lifts - vertical 1/2 roll  - used smaller blue roll     Tracy received the following manual therapy techniques: Joint mobilizations, Manual traction and Soft tissue Mobilization were applied to the: cervical and thoracic spine  for 30 minutes including:  Seated - STM to bilateral Upper traps with trigger point release on right     MET to improve Right rotation from thoracic spine - Left rotation upper t-spine segments with resisted left lateral SB after moving head to restricted flexion/RR - repeated x 5 ; Seated - therapist in front of patient - extension of upper t-spine segments. X 5 with patient applying slight resistance on arms.  Supine - cervical manual traction; OA extension; Facet mobilization from mid-tspine " to cervical; Lateral gliding of C1/C2 to left for right rotation;  Prone - STM with tissue lifting bilateral upper traps and paraspinals through thoracic area;  Grade II mobilization into extension from T1 through T8;     Cervical Range of Motion:     Degrees Pain   Flexion 21 > 52 Discomfort    Extension 12 > 60 Pain > discomfort   Right Rotation 30 > 40 Pain > discomfort   Left Rotation 41 > 60     Right Side Bending 10 > 27     Left Side Bending 6 > 12 Pain  > discomfort          Written Home Exercises Provided:   Tracy was given HEP on evaluation - concentrate on neck retractions - put tongue on roof of mouth to prevent clenching teeth; S/L thoracic stretches; Prone abductions and extensions - no flexion in prone right now.   Pt demo good understanding of the education provided. Tracy demonstrated good return demonstration of activities.     Education provided re:  Tracy verbalized good understanding of education provided.   No spiritual or educational barriers to learning provided    Assessment     Patient tolerated treatment well; Significant improvement in cervical AROM noted (see above) in all planes of motion; no Pain, just discomfort.   This is a 64 y.o. female referred to outpatient physical therapy and presents with a medical diagnosis of cervicalgia and demonstrates limitations as described in the problem list. Pt prognosis is Good. Pt will continue to benefit from skilled outpatient physical therapy to address the deficits listed in the problem list, provide pt/family education and to maximize pt's level of independence in the home and community environment.     Goals as follows:    Long Term Goals: 6 weeks  Pain: Decrease pain to no more than 3/10 to allow for improved ability to perform daily and recreational activities   Strength: Improve strength in deep neck flexor mm from able to hold x 10 secs to at least 20 secs and improve posterior scapula mm strength to at least 4+/5 for improved cervical  stability  ROM: Improve ROM to 60% of normal limits   Functional scale: Improve score on UEFS and Neck disability index to <35% limitation   Lifting: Lift 25 lbs to waist level, 15 lbs to shoulder level, 10 lbs to overhead without pain or compensation  Postures: Increase sitting and/or standing duration to 60 mins  without pain   Transfers: Perform sit <> Stand and supine <> sit  transfers without increased pain or limitation  Exercise: demonstrate independence with home exercise program to maintain gains made in therapy.          Plan     Continue with established Plan of Care towards PT goals.    Therapist: Alexandra Mitchell, PT  2/10/2020

## 2020-02-14 ENCOUNTER — CLINICAL SUPPORT (OUTPATIENT)
Dept: REHABILITATION | Facility: HOSPITAL | Age: 65
End: 2020-02-14
Payer: MEDICARE

## 2020-02-14 DIAGNOSIS — M54.2 NECK PAIN, CHRONIC: ICD-10-CM

## 2020-02-14 DIAGNOSIS — G89.29 CHRONIC PAIN OF BOTH SHOULDERS: Primary | ICD-10-CM

## 2020-02-14 DIAGNOSIS — M25.511 CHRONIC PAIN OF BOTH SHOULDERS: Primary | ICD-10-CM

## 2020-02-14 DIAGNOSIS — G89.29 NECK PAIN, CHRONIC: ICD-10-CM

## 2020-02-14 DIAGNOSIS — M25.512 CHRONIC PAIN OF BOTH SHOULDERS: Primary | ICD-10-CM

## 2020-02-14 PROCEDURE — 97140 MANUAL THERAPY 1/> REGIONS: CPT | Mod: PN

## 2020-02-14 PROCEDURE — 97110 THERAPEUTIC EXERCISES: CPT | Mod: PN

## 2020-02-14 NOTE — PROGRESS NOTES
"                                                    Physical Therapy Daily Note     Name: Tracy Meyer  Clinic Number: [id number]  Diagnosis:   Encounter Diagnoses   Name Primary?    Chronic pain of both shoulders Yes    Neck pain, chronic      Physician: Nila Leung MD  Precautions: standard  Visit #: 11 of 12  PTA Visit #: 0  Time In:   7:00 AM   Time Out:  8:00 AM     Subjective     Pt reports:    I'm doing better, just a little sore on my right side; No reoccurrence of numbness in my RUE since the other day. Headaches   Pain Scale: Tracy rates pain on a scale of 0-10 to be  3-4 currently.    Objective     Tracy received individual therapeutic exercises to develop strength, ROM, posture and core stabilization for 30 minutes including:  Chin tucks   S/L thoracic stretches   Prone - thoracic/lumbar extension x 10  Prone - alternate arms x 10   Prone - "w's" x 10  Prone - bilateral shoulder abduction x 10  Prone - bilateral shoulder extension x 10 (palms up)  Supine - bilateral shoulder flexion over 1/2 roll x 15 - used smaller blue roll   Supine: scapular retractions - lying on 1/2 roll vertically - used smaller blue roll   Supine - serratus lifts - vertical 1/2 roll  - used smaller blue roll     Tracy received the following manual therapy techniques: Joint mobilizations, Manual traction and Soft tissue Mobilization were applied to the: cervical and thoracic spine  for 30 minutes including:  Seated - STM to bilateral Upper traps with trigger point release on right     MET to improve Right rotation from thoracic spine - Left rotation upper t-spine segments with resisted left lateral SB after moving head to restricted flexion/RR - repeated x 5 ; Seated - therapist in front of patient - extension of upper t-spine segments. X 5 with patient applying slight resistance on arms.  Supine - cervical manual traction; OA extension; Facet mobilization from mid-tspine to cervical; Lateral gliding of C1/C2 to left for right " rotation;  Prone - STM with tissue lifting bilateral upper traps and paraspinals through thoracic area;  Grade II mobilization into extension from T1 through T8;     Cervical Range of Motion:     Degrees Pain   Flexion 21 > 52 Discomfort    Extension 12 > 60 Pain > discomfort   Right Rotation 30 > 40 Pain > discomfort   Left Rotation 41 > 60     Right Side Bending 10 > 27     Left Side Bending 6 > 12 Pain  > discomfort          Written Home Exercises Provided:   Tracy was given HEP on evaluation - concentrate on neck retractions - put tongue on roof of mouth to prevent clenching teeth; S/L thoracic stretches; Prone abductions and extensions - no flexion in prone right now.   Pt demo good understanding of the education provided. Tracy demonstrated good return demonstration of activities.     Education provided re:  Tracy verbalized good understanding of education provided.   No spiritual or educational barriers to learning provided    Assessment     Patient tolerated treatment well; Significant improvement in cervical AROM noted (see above) in all planes of motion; no Pain, just discomfort.   This is a 64 y.o. female referred to outpatient physical therapy and presents with a medical diagnosis of cervicalgia and demonstrates limitations as described in the problem list. Pt prognosis is Good. Pt will continue to benefit from skilled outpatient physical therapy to address the deficits listed in the problem list, provide pt/family education and to maximize pt's level of independence in the home and community environment.     Goals as follows:    Long Term Goals: 6 weeks  Pain: Decrease pain to no more than 3/10 to allow for improved ability to perform daily and recreational activities   Strength: Improve strength in deep neck flexor mm from able to hold x 10 secs to at least 20 secs and improve posterior scapula mm strength to at least 4+/5 for improved cervical stability  ROM: Improve ROM to 60% of normal limits    Functional scale: Improve score on UEFS and Neck disability index to <35% limitation   Lifting: Lift 25 lbs to waist level, 15 lbs to shoulder level, 10 lbs to overhead without pain or compensation  Postures: Increase sitting and/or standing duration to 60 mins  without pain   Transfers: Perform sit <> Stand and supine <> sit  transfers without increased pain or limitation  Exercise: demonstrate independence with home exercise program to maintain gains made in therapy.          Plan     Continue with established Plan of Care towards PT goals.One additional visit on POC Monday - will re-assess at this time.     Therapist: Alexandra Mitchell, PT  2/14/2020

## 2020-02-17 ENCOUNTER — CLINICAL SUPPORT (OUTPATIENT)
Dept: REHABILITATION | Facility: HOSPITAL | Age: 65
End: 2020-02-17
Payer: MEDICARE

## 2020-02-17 DIAGNOSIS — G89.29 CHRONIC PAIN OF BOTH SHOULDERS: ICD-10-CM

## 2020-02-17 DIAGNOSIS — M54.2 NECK PAIN, CHRONIC: Primary | ICD-10-CM

## 2020-02-17 DIAGNOSIS — M25.511 CHRONIC PAIN OF BOTH SHOULDERS: ICD-10-CM

## 2020-02-17 DIAGNOSIS — M25.512 CHRONIC PAIN OF BOTH SHOULDERS: ICD-10-CM

## 2020-02-17 DIAGNOSIS — G89.29 NECK PAIN, CHRONIC: Primary | ICD-10-CM

## 2020-02-17 PROCEDURE — 97140 MANUAL THERAPY 1/> REGIONS: CPT | Mod: PN

## 2020-02-17 NOTE — PROGRESS NOTES
"                                                    Physical Therapy Daily Note     Name: Tracy Meyer  Clinic Number: [id number]  Diagnosis:   Encounter Diagnoses   Name Primary?    Neck pain, chronic Yes    Chronic pain of both shoulders      Physician: Nila Leung MD  Precautions: standard  Visit #: 12 of 12  PTA Visit #: 0  Time In:   7:00 AM   Time Out:  8:00 AM     Subjective     Pt reports:    I'm doing better, just a little sore on my right side; No reoccurrence of numbness in my RUE since the other day.   Pain Scale: Tracy rates pain on a scale of 0-10 to be  3-4 currently. - pain is worse in the mornings, but with movement does get less; Tracy states that the pain never really goes away.     Objective     Tracy received individual therapeutic exercises to develop strength, ROM, posture and core stabilization for 30 minutes including:  Chin tucks   S/L thoracic stretches   Prone - thoracic/lumbar extension x 10  Prone - alternate arms x 10   Prone - "w's" x 10  Prone - bilateral shoulder abduction x 10  Prone - bilateral shoulder extension x 10 (palms up)  Supine - bilateral shoulder flexion over 1/2 roll x 15 - used smaller blue roll   Supine: scapular retractions - lying on 1/2 roll vertically - used smaller blue roll   Supine - serratus lifts - vertical 1/2 roll  - used smaller blue roll     Tracy received the following manual therapy techniques: Joint mobilizations, Manual traction and Soft tissue Mobilization were applied to the: cervical and thoracic spine  for 30 minutes including:  Seated - STM to bilateral Upper traps with trigger point release on right     MET to improve Right rotation from thoracic spine - Left rotation upper t-spine segments with resisted left lateral SB after moving head to restricted flexion/RR - repeated x 5 ; Seated - therapist in front of patient - extension of upper t-spine segments. X 5 with patient applying slight resistance on arms.  Supine - cervical manual " traction; OA extension; Facet mobilization from mid-tspine to cervical; Lateral gliding of C1/C2 to left for right rotation;  Prone - STM with tissue lifting bilateral upper traps and paraspinals through thoracic area;  Grade II mobilization into extension from T1 through T8;     Cervical Range of Motion: updated 2/17/2020     Degrees Pain   Flexion 21 > 52 Discomfort    Extension 12 > 60 Pain > discomfort   Right Rotation 30 > 40> 52 Pain > discomfort   Left Rotation 41 > 60 > 70     Right Side Bending 10 > 27     Left Side Bending 6 > 12 Pain  > discomfort          Written Home Exercises Provided:   Tracy was given HEP on evaluation - concentrate on neck retractions - put tongue on roof of mouth to prevent clenching teeth; S/L thoracic stretches; Prone abductions and extensions - no flexion in prone right now.   Pt demo good understanding of the education provided. Tracy demonstrated good return demonstration of activities.     Education provided re:  Tracy verbalized good understanding of education provided.   No spiritual or educational barriers to learning provided    Assessment     Patient tolerated treatment well; Significant improvement in cervical AROM noted (see above) in all planes of motion noted from evaluation; still having pain upon waking and throughout the  Day on the right side; Discussed changing pillows - gave her some options; Discussed further treatment - agreed on 1x/week for up to another 6 weeks for manual treatment to address remaining ROM restrictions and pain.   This is a 64 y.o. female referred to outpatient physical therapy and presents with a medical diagnosis of cervicalgia and demonstrates limitations as described in the problem list. Pt prognosis is Good. Pt will continue to benefit from skilled outpatient physical therapy to address the deficits listed in the problem list, provide pt/family education and to maximize pt's level of independence in the home and community environment.      Goals as follows:  Updated goals and progress:   Long Term Goals: 6 weeks > continue x 6 weeks   Pain: Decrease pain to no more than 3/10 to allow for improved ability to perform daily and recreational activities -Progress noted - waking up with pain at a 4-5 level with decrease to 3 once she gets up and moving and performs her exercises   Strength: Improve strength in deep neck flexor mm from able to hold x 10 secs to at least 20 secs and improve posterior scapula mm strength to at least 4+/5 for improved cervical stability - Able to sustain DNF hold x 15 secs now - progressing toward goal   ROM: Improve ROM to 60% of normal limits - Goals achieved - revised goal:  Improve ROM to within 75% of normal limits.   Functional scale: Improve score on UEFS and Neck disability index to <35% limitation ;   Lifting: Lift 25 lbs to waist level, 15 lbs to shoulder level, 10 lbs to overhead without pain or compensation > on going - unable to lift 10# above her head without pain  Postures: Increase sitting and/or standing duration to 60 mins  without pain > progressing - able to  place times 30 mins   Transfers: Perform sit <> Stand and supine <> sit  transfers without increased pain or limitation > goals achieved   Exercise: demonstrate independence with home exercise program to maintain gains made in therapy. > goals achieved          Plan     Continue with established Plan of Care towards PT goals.  Decrease visits to 1x/week x 6 weeks     Therapist: Alexandra Mitchell, PT  2/17/2020

## 2020-02-24 ENCOUNTER — CLINICAL SUPPORT (OUTPATIENT)
Dept: REHABILITATION | Facility: HOSPITAL | Age: 65
End: 2020-02-24
Payer: MEDICARE

## 2020-02-24 DIAGNOSIS — M25.511 CHRONIC PAIN OF BOTH SHOULDERS: Primary | ICD-10-CM

## 2020-02-24 DIAGNOSIS — G89.29 NECK PAIN, CHRONIC: ICD-10-CM

## 2020-02-24 DIAGNOSIS — M25.512 CHRONIC PAIN OF BOTH SHOULDERS: Primary | ICD-10-CM

## 2020-02-24 DIAGNOSIS — M54.2 NECK PAIN, CHRONIC: ICD-10-CM

## 2020-02-24 DIAGNOSIS — G89.29 CHRONIC PAIN OF BOTH SHOULDERS: Primary | ICD-10-CM

## 2020-02-24 PROCEDURE — 97535 SELF CARE MNGMENT TRAINING: CPT | Mod: PN

## 2020-02-24 PROCEDURE — 97140 MANUAL THERAPY 1/> REGIONS: CPT | Mod: PN

## 2020-02-24 NOTE — PROGRESS NOTES
"                                                    Physical Therapy Daily Note     Name: Tracy Meyer  Clinic Number: [id number]  Diagnosis:   Encounter Diagnoses   Name Primary?    Chronic pain of both shoulders Yes    Neck pain, chronic      Physician: Nila Leung MD  Precautions: standard  Visit #: 13  PTA Visit #: 0  Time In:   7:00 AM   Time Out:  8:00 AM     Subjective     Pt reports:    I'm doing ok; I played 9 holes of golf yesterday, woke up with a headache and some soreness on Right shoulder area - feels this is from playing golf. Tracy stated that this is why she cannot play more than 9 holes at one time.- always has tightness/headache after.   Pain Scale: Tracy rates pain on a scale of 0-10 to be  3-4 currently. - pain is worse in the mornings, but with movement does get less; Tracy states that the pain never really goes away. Just gets less     Objective     Tracy received individual therapeutic exercises to develop strength, ROM, posture and core stabilization for 30 minutes including:  Chin tucks   S/L thoracic stretches   Prone - thoracic/lumbar extension x 10  Prone - alternate arms x 10   Prone - "w's" x 10  Prone - bilateral shoulder abduction x 10  Prone - bilateral shoulder extension x 10 (palms up)  Supine - bilateral shoulder flexion over 1/2 roll x 15 - used smaller blue roll   Supine: scapular retractions - lying on 1/2 roll vertically - used smaller blue roll   Supine - serratus lifts - vertical 1/2 roll  - used smaller blue roll     Tracy received the following manual therapy techniques: Joint mobilizations, Manual traction and Soft tissue Mobilization were applied to the: cervical and thoracic spine  for 30 minutes including:  Seated - STM to bilateral Upper traps with trigger point release on right     MET to improve Right rotation from thoracic spine - Left rotation upper t-spine segments with resisted left lateral SB after moving head to restricted flexion/RR - repeated x 5 ; " Seated - therapist in front of patient - extension of upper t-spine segments. X 5 with patient applying slight resistance on arms.  Supine - cervical manual traction; OA extension; Facet mobilization from mid-tspine to cervical; Lateral gliding of C1/C2 to left for right rotation;  Prone - STM with tissue lifting bilateral upper traps and paraspinals through thoracic area;  Grade II mobilization into extension from T1 through T8;     Cervical Range of Motion: updated 2/17/2020     Degrees Pain   Flexion 21 > 52 Discomfort    Extension 12 > 60 Pain > discomfort   Right Rotation 30 > 40> 52 Pain > discomfort   Left Rotation 41 > 60 > 70     Right Side Bending 10 > 27     Left Side Bending 6 > 12 Pain  > discomfort          Written Home Exercises Provided:   Tracy was given HEP on evaluation - concentrate on neck retractions - put tongue on roof of mouth to prevent clenching teeth; S/L thoracic stretches; Prone abductions and extensions - no flexion in prone right now.   Pt demo good understanding of the education provided. Tracy demonstrated good return demonstration of activities.     Education provided re:  Tracy verbalized good understanding of education provided.   No spiritual or educational barriers to learning provided    Assessment     Patient tolerated treatment well; Significant improvement in cervical AROM noted (see above) in all planes of motion noted from evaluation; still having pain upon waking and throughout the  Day on the right side; Discussed changing pillows - gave her some options; Discussed further treatment - agreed on 1x/week for up to another 6 weeks for manual treatment to address remaining ROM restrictions and pain.Today Tracy felt like she has come as far as she can with therapy. We discussed stretching; exercises to open up her thoracic area - needs to keep this flexible as well as strong.   This is a 64 y.o. female referred to outpatient physical therapy and presents with a medical  diagnosis of cervicalgia and demonstrates limitations as described in the problem list. Pt prognosis is Good. Tracy feels like she can continue on her own from here.  She is aware of need to keep active, stretching - which she was already doing before starting therapy with me.     Goals as follows:  Updated goals and progress:   Long Term Goals: 6 weeks > continue x 6 weeks   Pain: Decrease pain to no more than 3/10 to allow for improved ability to perform daily and recreational activities -Progress noted - waking up with pain at a 4-5 level with decrease to 3 once she gets up and moving and performs her exercises   Strength: Improve strength in deep neck flexor mm from able to hold x 10 secs to at least 20 secs and improve posterior scapula mm strength to at least 4+/5 for improved cervical stability - Able to sustain DNF hold x 15 secs now - progressing toward goal   ROM: Improve ROM to 60% of normal limits - Goals achieved - revised goal:  Improve ROM to within 75% of normal limits.   Functional scale: Improve score on UEFS and Neck disability index to <35% limitation ;   Lifting: Lift 25 lbs to waist level, 15 lbs to shoulder level, 10 lbs to overhead without pain or compensation > on going - unable to lift 10# above her head without pain  Postures: Increase sitting and/or standing duration to 60 mins  without pain > progressing - able to  place times 30 mins   Transfers: Perform sit <> Stand and supine <> sit  transfers without increased pain or limitation > goals achieved   Exercise: demonstrate independence with home exercise program to maintain gains made in therapy. > goals achieved          Plan     Discharge from Therapy at this time - Tracy to continue with [facility]    Therapist: Alexandra Mitchell, PT  2/24/2020

## 2020-07-08 ENCOUNTER — HOSPITAL ENCOUNTER (EMERGENCY)
Facility: HOSPITAL | Age: 65
Discharge: HOME | End: 2020-07-08
Attending: EMERGENCY MEDICINE
Payer: MEDICARE

## 2020-07-08 VITALS
RESPIRATION RATE: 20 BRPM | OXYGEN SATURATION: 99 % | DIASTOLIC BLOOD PRESSURE: 66 MMHG | BODY MASS INDEX: 21.86 KG/M2 | HEIGHT: 66 IN | SYSTOLIC BLOOD PRESSURE: 132 MMHG | WEIGHT: 136 LBS | HEART RATE: 93 BPM | TEMPERATURE: 98 F

## 2020-07-08 DIAGNOSIS — B34.9 VIRAL SYNDROME: Primary | ICD-10-CM

## 2020-07-08 DIAGNOSIS — R05.9 COUGH: ICD-10-CM

## 2020-07-08 PROCEDURE — 71045 X-RAY EXAM CHEST 1 VIEW: CPT | Mod: TC,FY

## 2020-07-08 PROCEDURE — 99283 EMERGENCY DEPT VISIT LOW MDM: CPT | Mod: 25

## 2020-07-08 PROCEDURE — 71045 XR CHEST AP PORTABLE: ICD-10-PCS | Mod: 26,,, | Performed by: RADIOLOGY

## 2020-07-08 PROCEDURE — 71045 X-RAY EXAM CHEST 1 VIEW: CPT | Mod: 26,,, | Performed by: RADIOLOGY

## 2020-07-08 RX ORDER — FLUTICASONE PROPIONATE 50 MCG
1 SPRAY, SUSPENSION (ML) NASAL DAILY
COMMUNITY

## 2020-07-08 NOTE — ED PROVIDER NOTES
Encounter Date: 7/8/2020       History     Chief Complaint   Patient presents with    Cough     Patient complaining of cough and congestion and body aches, was put on Augmentin for sinus infection, takes last pill today, took a COVID test Monday, not resulted yet.    Nasal Congestion     Patient presents to the ER with complaint of cough and body aches.  Patient states was evaluated recently for COVID and sinusitis patient states test was performed on Monday is unaware of the results at this time for her COVID.  Patient did state that she was treated with Augmentin for her sinus infection and states today is the last day of her medications but continues to have body aches.  Patient denied any nausea vomiting diarrhea denies any known sick contacts denies known fever.  Patient states has been taking guaifenesin.  Patient denied chest pain trouble breathing abdominal pain nausea vomiting diarrhea or other associated symptoms.    The history is provided by the patient.     Review of patient's allergies indicates:  No Known Allergies  History reviewed. No pertinent past medical history.  Past Surgical History:   Procedure Laterality Date    APPENDECTOMY      CHOLECYSTECTOMY       History reviewed. No pertinent family history.  Social History     Tobacco Use    Smoking status: Never Smoker   Substance Use Topics    Alcohol use: Yes     Comment: occ    Drug use: Never     Review of Systems   Constitutional: Negative for chills, diaphoresis and fever.   HENT: Positive for congestion and rhinorrhea. Negative for ear discharge, ear pain, facial swelling, nosebleeds, sinus pressure, sinus pain, sore throat, trouble swallowing and voice change.    Respiratory: Positive for cough. Negative for choking, chest tightness, shortness of breath, wheezing and stridor.    Cardiovascular: Negative for chest pain, palpitations and leg swelling.   Gastrointestinal: Negative for abdominal pain, constipation, diarrhea, nausea and  vomiting.   Genitourinary: Negative for dysuria.   Musculoskeletal: Negative for back pain, gait problem, neck pain and neck stiffness.   Skin: Negative for rash.   Neurological: Negative for weakness.   Hematological: Does not bruise/bleed easily.   All other systems reviewed and are negative.      Physical Exam     Initial Vitals [07/08/20 1123]   BP Pulse Resp Temp SpO2   132/66 93 20 98.1 °F (36.7 °C) 99 %      MAP       --         Physical Exam    Nursing note and vitals reviewed.  Constitutional: She appears well-developed and well-nourished. She is not diaphoretic. No distress.   HENT:   Head: Atraumatic.   Right Ear: Tympanic membrane, external ear and ear canal normal.   Left Ear: Tympanic membrane, external ear and ear canal normal.   Nose: Mucosal edema and rhinorrhea (Thin clear bilateral) present. No nose lacerations, sinus tenderness, nasal deformity, septal deviation or nasal septal hematoma. No epistaxis.  No foreign bodies. Right sinus exhibits no maxillary sinus tenderness and no frontal sinus tenderness. Left sinus exhibits no maxillary sinus tenderness and no frontal sinus tenderness.   Mouth/Throat: Uvula is midline, oropharynx is clear and moist and mucous membranes are normal. No oropharyngeal exudate.   Eyes: Conjunctivae and EOM are normal. Pupils are equal, round, and reactive to light. Right eye exhibits no discharge. Left eye exhibits no discharge.   Neck: Normal range of motion. Neck supple.   Cardiovascular: Normal rate, regular rhythm, normal heart sounds and intact distal pulses. Exam reveals no gallop and no friction rub.    No murmur heard.  Pulmonary/Chest: Breath sounds normal. No respiratory distress. She has no wheezes. She has no rhonchi. She has no rales. She exhibits no tenderness.   Abdominal: Soft. She exhibits no distension and no mass. There is no abdominal tenderness. There is no rebound and no guarding.   Musculoskeletal: Normal range of motion. No tenderness.    Neurological: She is alert and oriented to person, place, and time. GCS score is 15. GCS eye subscore is 4. GCS verbal subscore is 5. GCS motor subscore is 6.   Skin: Skin is warm and dry. Capillary refill takes less than 2 seconds.   Psychiatric: She has a normal mood and affect. Thought content normal.         ED Course   Procedures  Labs Reviewed - No data to display       Imaging Results          X-Ray Chest AP Portable (Final result)  Result time 07/08/20 11:56:08    Final result by Prashant Rizo MD (07/08/20 11:56:08)                 Impression:      No definite acute cardiopulmonary disease      Electronically signed by: Prashant Rizo MD  Date:    07/08/2020  Time:    11:56             Narrative:    EXAMINATION:  XR CHEST AP PORTABLE    CLINICAL HISTORY:  Cough    TECHNIQUE:  Single frontal view of the chest was performed.    COMPARISON:  None    FINDINGS:  Lungs appear clear.  The cardiomediastinal silhouette and bony structures are unremarkable.                                 Medical Decision Making:   Differential Diagnosis:   Viral syndrome, COVID, sinusitis, allergic rhinitis, restless leg  Clinical Tests:   Radiological Study: Ordered and Reviewed  ED Management:  Discussed with patient plan of care patient states she understood she did not have any questions.    Discussed with patient imaging results as well as plan of care with over-the-counter Tylenol ibuprofen and hydration discussed return to ER precautions as well as need for follow-up with primary care the patient verbalized that she understood she did not have any questions.    Due the patient's vitals being within normal limits no history of medical problems normal chest x-ray and exam findings patient is stable and able for outpatient treatment of follow-up with primary care.    This note was created using M*Modal voice recognition software that occasionally misinterpreted phrases or words.                                 Clinical  Impression:       ICD-10-CM ICD-9-CM   1. Viral syndrome  B34.9 079.99   2. Cough  R05 786.2             ED Disposition Condition    Discharge Stable        ED Prescriptions     None        Follow-up Information     Follow up With Specialties Details Why Contact Info    Roya Nye   As needed [address]  [address]  [facility] [id number]      [facility] - ED Emergency Medicine  If symptoms worsen [address] [facility] [address] [phone]                                     SANGEETA Owen  07/08/20 6059

## 2020-07-08 NOTE — ED TRIAGE NOTES
Patient complaining of cough and congestion and body aches, was put on Augmentin for sinus infection, takes last pill today, took a COVID test Monday, not resulted yet.

## 2020-07-08 NOTE — DISCHARGE INSTRUCTIONS
If acute worsening of symptoms Return to ER for reevaluation    Follow up with PCP in 24-48 hours for reevaluation.    May take over-the-counter Tylenol and/or ibuprofen as needed for aches or pains.

## 2021-08-12 DIAGNOSIS — M79.669 LOWER LEG PAIN: Primary | ICD-10-CM

## 2021-08-13 ENCOUNTER — CLINICAL SUPPORT (OUTPATIENT)
Dept: REHABILITATION | Facility: HOSPITAL | Age: 66
End: 2021-08-13
Payer: MEDICARE

## 2021-08-13 DIAGNOSIS — M79.669 LOWER LEG PAIN: ICD-10-CM

## 2021-08-13 DIAGNOSIS — M79.662 PAIN IN BOTH LOWER LEGS: ICD-10-CM

## 2021-08-13 DIAGNOSIS — M79.661 PAIN IN BOTH LOWER LEGS: ICD-10-CM

## 2021-08-13 PROCEDURE — 97161 PT EVAL LOW COMPLEX 20 MIN: CPT | Mod: PN

## 2021-08-13 PROCEDURE — 97112 NEUROMUSCULAR REEDUCATION: CPT | Mod: PN

## 2021-08-18 ENCOUNTER — CLINICAL SUPPORT (OUTPATIENT)
Dept: REHABILITATION | Facility: HOSPITAL | Age: 66
End: 2021-08-18
Payer: MEDICARE

## 2021-08-18 DIAGNOSIS — S86.899A ANTERIOR SHIN SPLINTS: ICD-10-CM

## 2021-08-18 PROCEDURE — 97140 MANUAL THERAPY 1/> REGIONS: CPT | Mod: PN

## 2021-08-18 PROCEDURE — 97112 NEUROMUSCULAR REEDUCATION: CPT | Mod: PN

## 2021-08-20 ENCOUNTER — CLINICAL SUPPORT (OUTPATIENT)
Dept: REHABILITATION | Facility: HOSPITAL | Age: 66
End: 2021-08-20
Payer: MEDICARE

## 2021-08-20 DIAGNOSIS — S86.899A ANTERIOR SHIN SPLINTS: ICD-10-CM

## 2021-08-20 PROCEDURE — 97112 NEUROMUSCULAR REEDUCATION: CPT | Mod: PN

## 2021-08-20 PROCEDURE — 97140 MANUAL THERAPY 1/> REGIONS: CPT | Mod: PN

## 2021-08-25 ENCOUNTER — CLINICAL SUPPORT (OUTPATIENT)
Dept: REHABILITATION | Facility: HOSPITAL | Age: 66
End: 2021-08-25
Payer: MEDICARE

## 2021-08-25 DIAGNOSIS — S86.899A ANTERIOR SHIN SPLINTS: ICD-10-CM

## 2021-08-25 PROCEDURE — 97140 MANUAL THERAPY 1/> REGIONS: CPT | Mod: PN

## 2021-08-25 PROCEDURE — 97035 APP MDLTY 1+ULTRASOUND EA 15: CPT | Mod: PN

## 2021-08-27 ENCOUNTER — CLINICAL SUPPORT (OUTPATIENT)
Dept: REHABILITATION | Facility: HOSPITAL | Age: 66
End: 2021-08-27
Payer: MEDICARE

## 2021-08-27 DIAGNOSIS — S86.899A ANTERIOR SHIN SPLINTS: ICD-10-CM

## 2021-08-27 PROCEDURE — 97035 APP MDLTY 1+ULTRASOUND EA 15: CPT | Mod: PN

## 2021-08-27 PROCEDURE — 97140 MANUAL THERAPY 1/> REGIONS: CPT | Mod: PN

## 2021-09-07 ENCOUNTER — CLINICAL SUPPORT (OUTPATIENT)
Dept: REHABILITATION | Facility: HOSPITAL | Age: 66
End: 2021-09-07
Payer: MEDICARE

## 2021-09-07 DIAGNOSIS — S86.899A ANTERIOR SHIN SPLINTS: ICD-10-CM

## 2021-09-07 DIAGNOSIS — M25.571 ACUTE RIGHT ANKLE PAIN: ICD-10-CM

## 2021-09-07 PROCEDURE — 97140 MANUAL THERAPY 1/> REGIONS: CPT | Mod: PN

## 2021-09-07 PROCEDURE — 97035 APP MDLTY 1+ULTRASOUND EA 15: CPT | Mod: PN

## 2021-09-08 PROBLEM — M25.571 ACUTE RIGHT ANKLE PAIN: Status: ACTIVE | Noted: 2021-09-08

## 2021-09-09 ENCOUNTER — CLINICAL SUPPORT (OUTPATIENT)
Dept: REHABILITATION | Facility: HOSPITAL | Age: 66
End: 2021-09-09
Payer: MEDICARE

## 2021-09-09 DIAGNOSIS — S86.899A ANTERIOR SHIN SPLINTS: ICD-10-CM

## 2021-09-09 DIAGNOSIS — M25.571 ACUTE RIGHT ANKLE PAIN: ICD-10-CM

## 2021-09-09 PROCEDURE — 97140 MANUAL THERAPY 1/> REGIONS: CPT | Mod: PN

## 2021-09-09 PROCEDURE — 97035 APP MDLTY 1+ULTRASOUND EA 15: CPT | Mod: PN

## 2021-09-13 ENCOUNTER — CLINICAL SUPPORT (OUTPATIENT)
Dept: REHABILITATION | Facility: HOSPITAL | Age: 66
End: 2021-09-13
Payer: MEDICARE

## 2021-09-13 DIAGNOSIS — S86.899A ANTERIOR SHIN SPLINTS: Primary | ICD-10-CM

## 2021-09-13 DIAGNOSIS — M25.571 ACUTE RIGHT ANKLE PAIN: ICD-10-CM

## 2021-09-13 PROCEDURE — 97035 APP MDLTY 1+ULTRASOUND EA 15: CPT | Mod: PN,CQ

## 2021-09-13 PROCEDURE — 97140 MANUAL THERAPY 1/> REGIONS: CPT | Mod: PN,CQ

## 2021-09-17 ENCOUNTER — CLINICAL SUPPORT (OUTPATIENT)
Dept: REHABILITATION | Facility: HOSPITAL | Age: 66
End: 2021-09-17
Payer: MEDICARE

## 2021-09-17 DIAGNOSIS — S86.899A ANTERIOR SHIN SPLINTS: Primary | ICD-10-CM

## 2021-09-17 DIAGNOSIS — M25.571 ACUTE RIGHT ANKLE PAIN: ICD-10-CM

## 2021-09-17 PROCEDURE — 97035 APP MDLTY 1+ULTRASOUND EA 15: CPT | Mod: PN,CQ

## 2021-09-17 PROCEDURE — 97140 MANUAL THERAPY 1/> REGIONS: CPT | Mod: PN,CQ

## 2021-09-17 PROCEDURE — 97110 THERAPEUTIC EXERCISES: CPT | Mod: PN,CQ

## 2021-09-20 ENCOUNTER — CLINICAL SUPPORT (OUTPATIENT)
Dept: REHABILITATION | Facility: HOSPITAL | Age: 66
End: 2021-09-20
Payer: MEDICARE

## 2021-09-20 DIAGNOSIS — M25.571 ACUTE RIGHT ANKLE PAIN: ICD-10-CM

## 2021-09-20 DIAGNOSIS — S86.899A ANTERIOR SHIN SPLINTS: Primary | ICD-10-CM

## 2021-09-20 PROCEDURE — 97140 MANUAL THERAPY 1/> REGIONS: CPT | Mod: PN,CQ

## 2021-09-20 PROCEDURE — 97110 THERAPEUTIC EXERCISES: CPT | Mod: PN,CQ

## 2021-09-20 PROCEDURE — 97035 APP MDLTY 1+ULTRASOUND EA 15: CPT | Mod: PN,CQ

## 2021-09-24 ENCOUNTER — CLINICAL SUPPORT (OUTPATIENT)
Dept: REHABILITATION | Facility: HOSPITAL | Age: 66
End: 2021-09-24
Payer: MEDICARE

## 2021-09-24 DIAGNOSIS — M25.571 ACUTE RIGHT ANKLE PAIN: ICD-10-CM

## 2021-09-24 DIAGNOSIS — S86.899A ANTERIOR SHIN SPLINTS: ICD-10-CM

## 2021-09-24 PROCEDURE — 97140 MANUAL THERAPY 1/> REGIONS: CPT | Mod: PN

## 2022-05-02 DIAGNOSIS — M25.551 RIGHT HIP PAIN: Primary | ICD-10-CM

## 2022-05-05 ENCOUNTER — CLINICAL SUPPORT (OUTPATIENT)
Dept: REHABILITATION | Facility: HOSPITAL | Age: 67
End: 2022-05-05
Attending: NURSE PRACTITIONER
Payer: MEDICARE

## 2022-05-05 DIAGNOSIS — R26.89 DECREASED FUNCTIONAL MOBILITY: ICD-10-CM

## 2022-05-05 DIAGNOSIS — M54.40 BACK PAIN OF LUMBAR REGION WITH SCIATICA: ICD-10-CM

## 2022-05-05 DIAGNOSIS — M25.551 RIGHT HIP PAIN: ICD-10-CM

## 2022-05-05 PROCEDURE — 97162 PT EVAL MOD COMPLEX 30 MIN: CPT | Mod: PN

## 2022-05-05 NOTE — PLAN OF CARE
[facility]  Physical Therapy Initial Evaluation / Plan Of Care    Name: Tracy Meyer  Clinic Number: [id number]    Therapy Diagnosis:   Encounter Diagnoses   Name Primary?    Right hip pain     Decreased functional mobility     Back pain of lumbar region with sciatica      Physician: Chapito Woodson FNP    Physician Orders: PT Eval and Treat   Medical Diagnosis: Right hip pain   Evaluation Date: 5/5/2022  Authorization period Expiration: 12/31/2022  Plan of Care Certification Period: 7/30/2022    Visit #: 1/ Visits authorized: 12  Time In:  8:30 am   Time Out: 9:15 am   Total Billable Time: 45 minutes    Precautions: Standard    Subjective   Date of onset: since August 2021 - started with lower leg pain - Right hip/buttock pain began in November 2021   Date of Surgery: n/a     No past medical history on file.  Tracy Meyer  has a past surgical history that includes Appendectomy and Cholecystectomy.    Tracy has a current medication list which includes the following prescription(s): amoxicillin/potassium clav, fluticasone propionate, and guaifenesin.    Review of patient's allergies indicates:  No Known Allergies     Imaging, MRI studies: patient stated that she had MRI's done of her lumbar spine and hips - stated that everything was normal; will bring in the copies.     Prior Therapy: Tracy had PT back in August for right lower leg and ankle pain - never really improved her symptoms   Social History: Patient lives in a single level home with 0 steps to enter; Her daughter's home has 22 steps up to bedrooms on 2nd floor; Tracy often goes over there to help with grandchildren - was there not too long ago - had increased difficulty carrying kids/things up and down the steps; pain in right over left hip and SI jt area.    Occupation: retired   Prior Level of Function: used to walk 3 miles - she began having difficulty in August, Tracy's  passed away from Pancreatic Cancer - she  went to [place] to bury him - walked over 3 miles up/down hills to the burial site - this started right lower leg pain - came for therapy, no real resolution of pain, sought Ortho care - no resolution, continued to walk, play golf, perform all of her usual + more activities.   Current Level of Function: Tracy reports that now can only walk 1.5 miles - walking up hills is very painful for her - causes increased right posterior hip and entire RLE pain; also notes pain on left, but reports it is minor compared to the Right side; She continues to walking daily, continues to play golf, yard work, all of her usual activities - sounds like she does not give her body time to rest - has had pain/dysfunction since August of last year.       Pain: current 7/10, worst 8/10, best 5/10,   Aching and Shooting, constant  Radicular symptoms: RLE back of thigh to knee, medial/lateral aspects of lower leg and into med aspect of foot - reports foot stays numb all of the time.   Aggravating Factors: Bending, Walking, Night Time, Lifting, Getting out of bed/chair and going up and down steps; driving /sitting   Easing Factors: changing positions, ice - but this is just temporary       Onset/LAURA: insidious    History of current condition - Tracy reports: Almost a year history of symptoms - started with RLE - lower leg pain with walking, now has progressed to entire RLE pain starting in lower back/RIght buttock/RIght anterior hip/groin and extending distally to right foot - reports right foot stays numb all of the time, Points to Right buttock, Right groin, Right lower extremity as points of great pain when she moves. Tracy keeps saying that she is so tight, her muscles are so tight, she just doesn't know what to do to make the pain go away. Tracy stated that walking up inclines, walking up steps are the most painful for her; She also has pain at night all of the time, every night. She has been to chiropractor, to foot/ankle ortho, and now to  another ortho who I think is the one sending her for an SI joint injection later today.     Pts goals: To figure out what is causing her pain and get rid of it so that she can get back to her regular activities without increased pain       Objective     Observation: Tracy has lost some weight since last seen - not needed; she appears distraught over her pain issues; just having a tremenedous amount of pain in her entire RLE, but does continue to walk, play golf and do most of her usual activities.     Posture:    -       Scoliosis    Gait: slight antalgic gait pattern noted with decreased stance time on her RLE.  Had her walk on TM at 3.5 mph - on incline - with increased incline, began to c/o increased pain in entire RLE.     Lumbar Range of Motion:    Active Range Pain   Flexion No restriction in flexion, but does have a right trunk deviation with forward motion  Pain in right buttock and groin noted    Extension Standing and prone - limited  Central discomfort    Left Side Bending Hand to mid thigh  Increased pain right side of lumbar spine    Right Side Bending Hand to knee joint  No pain    Left rotation 30 degrees   no pain    Right Rotation 35 degrees   no pain       Lower Extremity Strength   Left Right   Knee extension: 5/5 5/5   Knee flexion: 5/5 5/5   Hip flexion: 5/5 4+/5   Hip extension:  5/5 4+/5   Hip abduction: 5/5 4/5   Hip adduction: 4/5 4/5   Ankle dorsiflexion: 5/5 5/5   Ankle plantarflexion: 5/5 5/5   Upper abdominals 4/5    Lower abdominals 4/5    Back extensors 4/5      Special Tests:    Repeated Flexion Negative   Repeated Extension Negative   Prone Instability Negative   Straight Leg Raise Negative   Slump Negative   Quadrant Negative   Femoral nerve test Negative       DTR:   Left Right Comment   Patellar (L3-4) 2+ 2+    Achilles (S1) 2+ 2+        Joint Mobility:   Lumbar: CPA unrestricted,   RPA unrestricted  LPA unrestricted    Thoracic: unrestricted    Sacral Evaluation    Standing  Forward Flexion: Right PSIS moves first (stuck side); following mobilization of hips/sacrum, repeated standing forward flexion - getting more left sided lateral deviation as opposed to right   Standing Stork: negative   Supine to long sit: No leg length change noted   Prone: Left SS more inferior; and BIENVENIDO deeper/inferior - tested lumbar prone extension - BIENVENIDO's remain unchanged indicative of Right unilateral extended sacrum;  Contacted right SS - mobilized caudally and superiorly to re-balance pelvis. Patient able to perform forward trunk flexion with less pain, but with slight left lateral deviation.       Palpation: significant tenderness to palpation at right piriformis with slight reduction in hip external rotation when performed in figure 4 position; Significant tenderness to palpation right groin/adductors and along pubic bone - no alignment issues in pubic bones noted - squish test and distraction test all negative.     Sensation: intact to light touch     Flexibility:     90/90 SLR = R moderate restriction, L minimal restriction   Ely's test: R no restriction, L no restriction   Claudine's test: R no restriction, L no restriction   Joe test: R  No restriction, but increased pain in anterior hip/posterior hip, iliopsoas and rectum femoris     Limitation/Restriction for FOTO Hip Survey    Therapist reviewed FOTO scores for Tracy Meyer on 5/5/2022.   FOTO documents entered into [employer] - see Media section.    Limitation Score: 62%       PT Evaluation Completed: Yes  Discussed Plan of Care with patient: Yes      Home Exercises and Patient Education Provided  Education provided re:   - progress towards goals   - role of therapy in multi - disciplinary team, goals for therapy  Pt educated on condition, POC, and expectations in therapy.  No spiritual or educational barriers to learning provided    Home exercises:  Pt will be provided HEP during course of treatment with progressions as appropriate. Pt was advised to  perform these exercises free of pain, and to stop performing them if pain occurs.   Tracy demonstrated good  understanding of the education provided.     Assessment   Tracy is a 66 y.o. female referred to outpatient physical therapy with a medical diagnosis of right hip pain, and presents to PT with lumbar pain with RLE radicular symptoms distally to foot; weakness in hip mm, slight decrease in right hip ER with piriformis and psoas myofascial tightness/pain .  Unable to evaluate cervical spine today secondary to time constraints - but will look at range of motion as well as fascia restrictions here to check for dura tightness. Patient demonstrates limitations as described in the problem list. Pt will benefit from physcial therapy services in order to maximize pain free and/or functional use of right LE . The following goals were discussed with the patient and patient is in agreement with them as to be addressed in the treatment plan.   Pt prognosis is Good.   Pt will benefit from skilled outpatient Physical Therapy to address the deficits stated above and in the chart below, provide pt/family education, and to maximize pt's level of independence.     Plan of care discussed with patient: Yes  Pt's spiritual, cultural and educational needs considered and pt agreeable to plan of care and goals as stated below:     Anticipated Barriers for therapy: none    Medical necessity is demonstrated by the following IMPAIRMENTS/PROBLEM LIST:    weakness, impaired functional mobility, gait instability, impaired balance, decreased lower extremity function, pain, decreased ROM and myofascial restrictions       Medical Necessity is demonstrated by the following  History  Co-morbidities and personal factors that may impact the plan of care Co-morbidities:   anxiety, coping style/mechanism, depression and difficulty sleeping    Personal Factors:   coping style  0= low, 1-2 = mod, 3+ = high   moderate   Examination  Body Structures and  Functions, activity limitations and participation restrictions that may impact the plan of care Body Regions:   neck  back  lower extremities  trunk    Body Systems:    gross symmetry  ROM  strength  gross coordinated movement  balance  gait    Participation Restrictions:   none    Activity limitations:   Mobility  lifting and carrying objects  walking  driving (bike, car, motorcycle)  sitting     Self care  no deficits    Domestic Life  shopping  cooking  doing house work (cleaning house, washing dishes, laundry)    Community and Social Life  community life  recreation and leisure  1-2 = low, 3+ = mod, 4+ = high       moderate   Clinical Presentation evolving clinical presentation with changing clinical characteristics  Stable/uncomplicated = low, evolving/changing = mod,  Unstable/unpredictable = high moderate   Decision Making/ Complexity Score: moderate         Goals     Short Term Goals: 3 weeks  Pain: reduction in worst pain c/o's to no more than 5/10   Walking: able to walk 1.5 miles without increased RLE pain or compensation   Demonstrate compliance with initial exercise program    Long Term Goals: 6 weeks    Pain: Decrease pain to no more than 2/10 to allow for improved ability to perform all of her usual daily/recreational activities   Strength: Improve strength in core/hip muscles to 5/5 for improved lumbopelvic stability  ROM: Able to perform lumbar and bilateral hip AROM with functional ROM in all planes without increased pain or compensation    Functional scale: Improve score on FOTO  to 41% limitation   Lifting: Lift 20 lbs to waist level, 10 lbs to shoulder level, 5 lbs to overhead without pain or compensation  Walking: Increase walking distance  to 3 miles without pain   Postures: Increase sitting and/or standing duration to 30 mins without pain   Transfers: Perform all transfers without increased pain or limitation  Exercise: demonstrate independence with home exercise program to maintain gains  made in therapy.        Plan     Certification Period: 5/5/2022 to 7/10/2022.    Outpatient Physical Therapy 2 times weekly for 6 weeks to include the following interventions: patient education, Gait Training, Manual Therapy, Moist Heat/ Ice, Neuromuscular Re-ed, Patient Education, Therapeutic Activities, Therapeutic Exercise and Cold Laser, Dry Needling or other modalities as needed for reduction in inflammation .   Pt may be seen by PTA as part of the rehabilitation team.     I certify the need for these services furnished under this plan of treatment and while under my care.    Alexandra Mitchell, PT          Attestation:   I have seen the patient, reviewed the therapist's plan of care, and I agree with the plan of care.   I certify the need for these services furnished under this plan of treatment and while under my care.         _______________            ________                                               _____________________  Physician/Referring Practitioner                                                            Date of Signature

## 2022-05-06 NOTE — PLAN OF CARE
PT met face to face with Chaim Oakes PTA to discuss patient's treatment plan and progress towards established goals.  Treatment will be continued as described in initial report/eval and progress notes.  Patient will be seen by physical therapist every sixth visit and minimally once per month.    Additional information: ? Craniosacral retsrictions, please check fascia restrictions In c-spine next visit; right piriformis, right psoas, SI joint instability ?

## 2022-05-09 ENCOUNTER — CLINICAL SUPPORT (OUTPATIENT)
Dept: REHABILITATION | Facility: HOSPITAL | Age: 67
End: 2022-05-09
Attending: NURSE PRACTITIONER
Payer: MEDICARE

## 2022-05-09 DIAGNOSIS — M54.40 BACK PAIN OF LUMBAR REGION WITH SCIATICA: ICD-10-CM

## 2022-05-09 DIAGNOSIS — R26.89 DECREASED FUNCTIONAL MOBILITY: Primary | ICD-10-CM

## 2022-05-09 PROCEDURE — 97140 MANUAL THERAPY 1/> REGIONS: CPT | Mod: PN,CQ

## 2022-05-09 PROCEDURE — 97110 THERAPEUTIC EXERCISES: CPT | Mod: PN,CQ

## 2022-05-09 NOTE — PROGRESS NOTES
[facility]PAT[facility]ERAP[facility] AND [facility]   Physical Therapy Treatment Note     Name: Tracy Meyer  Clinic Number: [id number]    Therapy Diagnosis:   Encounter Diagnoses   Name Primary?    Decreased functional mobility Yes    Back pain of lumbar region with sciatica      Physician: Chapito Woodson FNP    Visit Date: 5/9/2022    Physician Orders: PT Eval and Treat   Medical Diagnosis: Right hip pain   Evaluation Date: 5/5/2022  Authorization period Expiration: 12/31/2022  Plan of Care Certification Period: 7/30/2022    Visit #: 2/ Visits authorized: 12    PTA Visit #: 1/5     Time In: 2:15 pm  Time Out: 3:15 pm  Total Billable Time: 60 minutes    SUBJECTIVE     Pt reports: 9 months of pain and she hopes we can help her.  She is compliant with home exercise program.  Response to previous treatment: good  Functional change: none    Pain: 6/10  Location: Radicular symptoms: RLE back of thigh to knee, medial/lateral aspects of lower leg and into med aspect of foot - reports foot stays numb all of the time.     OBJECTIVE     Objective Measures updated at progress report unless specified.     Treatment     Tracy received the treatments listed below:      therapeutic exercises to develop strength and range of motion for 15 minutes including:  SKC   Piriformis stretch  Psoas stretch    manual therapy techniques:  for 45 minutes, including:  Myofascial Release   Bilateral psoas   Bilateral piriformis    Bilateral quadratus lab.   Cranio-sacral     Energy muscle technique   Pelvis alignment    KT taping   Right tarsal tunnel   Right ITB      direct contact modalities after being cleared for contraindications:         Patient Education and Home Exercises     Home Exercises Provided and Patient Education Provided     Education provided:   Myofascial release    Written Home Exercises Provided: None. Exercises were reviewed and Tracy was able to demonstrate them prior to the end of the session.  Tracy demonstrated good  understanding of the education provided. See EMR under Patient Instructions for exercises provided during therapy sessions    ASSESSMENT   Patient responded well to the releases.  The right piriformis was the very tight and difficult to release.  Patient did very well with the cranio-sacral release: lots of tension noted.    Tracy is a 66 y.o. female referred to outpatient physical therapy with a medical diagnosis of right hip pain, and presents to PT with lumbar pain with RLE radicular symptoms distally to foot; weakness in hip mm, slight decrease in right hip ER with piriformis and psoas myofascial tightness/pain .       Tracy is progressing well towards her goals.   Pt prognosis is good    Pt will continue to benefit from skilled outpatient physical therapy to address the deficits listed in the problem list box on initial evaluation, provide pt/family education and to maximize pt's level of independence in the home and community environment.     Pt's spiritual, cultural and educational needs considered and pt agreeable to plan of care and goals.     Anticipated barriers to physical therapy: none    Goals:   Short Term Goals: 3 weeks  Pain: reduction in worst pain c/o's to no more than 5/10   Walking: able to walk 1.5 miles without increased RLE pain or compensation   Demonstrate compliance with initial exercise program     Long Term Goals: 6 weeks     Pain: Decrease pain to no more than 2/10 to allow for improved ability to perform all of her usual daily/recreational activities   Strength: Improve strength in core/hip muscles to 5/5 for improved lumbopelvic stability  ROM: Able to perform lumbar and bilateral hip AROM with functional ROM in all planes without increased pain or compensation    Functional scale: Improve score on FOTO  to 41% limitation   Lifting: Lift 20 lbs to waist level, 10 lbs to shoulder level, 5 lbs to overhead without pain or compensation  Walking: Increase walking distance  to 3 miles  without pain   Postures: Increase sitting and/or standing duration to 30 mins without pain   Transfers: Perform all transfers without increased pain or limitation  Exercise: demonstrate independence with home exercise program to maintain gains made in therapy.      PLAN   Continue with POC    Certification Period: 5/5/2022 to 7/10/2022.     Outpatient Physical Therapy 2 times weekly for 6 weeks to include the following interventions: patient education, Gait Training, Manual Therapy, Moist Heat/ Ice, Neuromuscular Re-ed, Patient Education, Therapeutic Activities, Therapeutic Exercise and Cold Laser, Dry Needling or other modalities as needed for reduction in inflammation .   Pt may be seen by PTA as part of the rehabilitation team.   Chaim Oakes PTA

## 2022-05-11 ENCOUNTER — CLINICAL SUPPORT (OUTPATIENT)
Dept: REHABILITATION | Facility: HOSPITAL | Age: 67
End: 2022-05-11
Attending: NURSE PRACTITIONER
Payer: MEDICARE

## 2022-05-11 DIAGNOSIS — R26.89 DECREASED FUNCTIONAL MOBILITY: Primary | ICD-10-CM

## 2022-05-11 DIAGNOSIS — M54.40 BACK PAIN OF LUMBAR REGION WITH SCIATICA: ICD-10-CM

## 2022-05-11 PROCEDURE — 97140 MANUAL THERAPY 1/> REGIONS: CPT | Mod: PN,CQ

## 2022-05-11 NOTE — PROGRESS NOTES
[facility]   Physical Therapy Treatment Note     Name: Tracy Meyer  Clinic Number: [id number]    Therapy Diagnosis:   Encounter Diagnoses   Name Primary?    Decreased functional mobility Yes    Back pain of lumbar region with sciatica      Physician: Chapito Woodson FNP    Visit Date: 5/11/2022    Physician Orders: PT Eval and Treat   Medical Diagnosis: Right hip pain   Evaluation Date: 5/5/2022  Authorization period Expiration: 12/31/2022  Plan of Care Certification Period: 7/30/2022    Visit #: 3/ Visits authorized: 12    PTA Visit #: 2/5     Time In: 2:15 pm  Time Out: 3:05 pm  Total Billable Time: 40 minutes    SUBJECTIVE     Pt reports: improvement in lumbar and pelvic motion since the last therapy.  She has had less pain in those areas but pointed out a tender T10 lateral to the right knot that is bothering her  She is compliant with home exercise program.  Response to previous treatment: good  Functional change: none    Pain: 5/10  Location: Radicular symptoms: RLE back of thigh to knee, medial/lateral aspects of lower leg and into med aspect of foot - reports foot stays numb all of the time.     OBJECTIVE     Objective Measures updated at progress report unless specified.     Treatment     Tracy received the treatments listed below:      therapeutic exercises to develop strength and range of motion for 0 minutes including:  SKC   Piriformis stretch  Psoas stretch    manual therapy techniques:  for 40 minutes, including:  Myofascial Release   Right  psoas   Right  piriformis    Right quad      direct contact modalities after being cleared for contraindications:   LLLT 810 laser along the sciatic nerve right leg from hip to knee 30 joules x 6 min        Patient Education and Home Exercises     Home Exercises Provided and Patient Education Provided     Education provided:   Myofascial release    Written Home Exercises Provided: None. Exercises were reviewed and Tracy was able to  demonstrate them prior to the end of the session.  Tracy demonstrated good understanding of the education provided. See EMR under Patient Instructions for exercises provided during therapy sessions    ASSESSMENT   Patient responded well to the releases.  The right piriformis is still to tight but a deeper release was noted.  Introduced LLLT to calm down the nerve.      Tracy is a 66 y.o. female referred to outpatient physical therapy with a medical diagnosis of right hip pain, and presents to PT with lumbar pain with RLE radicular symptoms distally to foot; weakness in hip mm, slight decrease in right hip ER with piriformis and psoas myofascial tightness/pain .       Tracy is progressing well towards her goals.   Pt prognosis is good    Pt will continue to benefit from skilled outpatient physical therapy to address the deficits listed in the problem list box on initial evaluation, provide pt/family education and to maximize pt's level of independence in the home and community environment.     Pt's spiritual, cultural and educational needs considered and pt agreeable to plan of care and goals.     Anticipated barriers to physical therapy: none    Goals:   Short Term Goals: 3 weeks  Pain: reduction in worst pain c/o's to no more than 5/10   Walking: able to walk 1.5 miles without increased RLE pain or compensation   Demonstrate compliance with initial exercise program     Long Term Goals: 6 weeks     Pain: Decrease pain to no more than 2/10 to allow for improved ability to perform all of her usual daily/recreational activities   Strength: Improve strength in core/hip muscles to 5/5 for improved lumbopelvic stability  ROM: Able to perform lumbar and bilateral hip AROM with functional ROM in all planes without increased pain or compensation    Functional scale: Improve score on FOTO  to 41% limitation   Lifting: Lift 20 lbs to waist level, 10 lbs to shoulder level, 5 lbs to overhead without pain or  compensation  Walking: Increase walking distance  to 3 miles without pain   Postures: Increase sitting and/or standing duration to 30 mins without pain   Transfers: Perform all transfers without increased pain or limitation  Exercise: demonstrate independence with home exercise program to maintain gains made in therapy.      PLAN   Continue with POC    Certification Period: 5/5/2022 to 7/10/2022.     Outpatient Physical Therapy 2 times weekly for 6 weeks to include the following interventions: patient education, Gait Training, Manual Therapy, Moist Heat/ Ice, Neuromuscular Re-ed, Patient Education, Therapeutic Activities, Therapeutic Exercise and Cold Laser, Dry Needling or other modalities as needed for reduction in inflammation .   Pt may be seen by PTA as part of the rehabilitation team.   Chaim Oakes PTA

## 2022-05-16 ENCOUNTER — CLINICAL SUPPORT (OUTPATIENT)
Dept: REHABILITATION | Facility: HOSPITAL | Age: 67
End: 2022-05-16
Attending: NURSE PRACTITIONER
Payer: MEDICARE

## 2022-05-16 DIAGNOSIS — M54.40 BACK PAIN OF LUMBAR REGION WITH SCIATICA: ICD-10-CM

## 2022-05-16 DIAGNOSIS — R26.89 DECREASED FUNCTIONAL MOBILITY: Primary | ICD-10-CM

## 2022-05-16 PROCEDURE — 97140 MANUAL THERAPY 1/> REGIONS: CPT | Mod: PN

## 2022-05-16 NOTE — PROGRESS NOTES
[facility]   Physical Therapy Treatment Note     Name: Tracy Hearnz  Clinic Number: [id number]    Therapy Diagnosis:   Encounter Diagnoses   Name Primary?    Decreased functional mobility Yes    Back pain of lumbar region with sciatica      Physician: Chapito Woodson FNP    Visit Date: 5/16/2022    Physician Orders: PT Eval and Treat   Medical Diagnosis: Right hip pain   Evaluation Date: 5/5/2022  Authorization period Expiration: 12/31/2022  Plan of Care Certification Period: 7/30/2022    Visit #: 3/ Visits authorized: 12    PTA Visit #: 2/5     Time In: 1:10  pm  Time Out: 1:50  pm  Total Billable Time: 40 minutes    SUBJECTIVE     Pt reports: Much less pain in my leg; still present in buttocks extending to right knee; right foot is still partially numb, but not all of the time.   She is compliant with home exercise program.  Response to previous treatment: good - Tracy reports that she has been feeling very good since her last visit here.   Functional change: able to walk and perform her daily activities with much less RLE pain.     Pain: 3-4 /10  Location: Radicular symptoms: RLE back of thigh to knee, medial/lateral aspects of lower leg and into med aspect of foot - reports foot stays numb all of the time.     OBJECTIVE     Objective Measures updated at progress report unless specified.     Treatment     Tracy received the treatments listed below:      therapeutic exercises to develop strength and range of motion for 0 minutes including:  SKC   Piriformis stretch  Psoas stretch    manual therapy techniques:  for 30 minutes, including:  MET for Right leg pain: Right hip extension/adduction with resisted right knee extension - hold x 5 sec, progressive stretch into extension/add and hold x 30 sec; repeated x 4;   Trigger point release right piriformis followed by STM of mm and into posterior hip and down ITB distally to fibular using small ball along fibers.     No MFR performed on  psoas or piriformis today as patient's gait pattern much improved, pain much improved, able to do her normal activities without increased pain over the weekend.         direct contact modalities after being cleared for contraindications:   LLLT 810 laser along the sciatic nerve right leg from hip to knee 30 joules x 6 min        Patient Education and Home Exercises     Home Exercises Provided and Patient Education Provided     Education provided:   Myofascial release    Written Home Exercises Provided: None. Exercises were reviewed and Tracy was able to demonstrate them prior to the end of the session.  Tracy demonstrated good understanding of the education provided. See EMR under Patient Instructions for exercises provided during therapy sessions    ASSESSMENT   Patient responded well to the releases at her last visit, had significant improvement in pain and gait pattern; Continued with trigger point and fascia work right piriformis and ITB using MET.  Advised Tracy to stick with same exercises at this point.  We do not want to irritate the tissues.      Tracy is a 66 y.o. female referred to outpatient physical therapy with a medical diagnosis of right hip pain, and presents to PT with lumbar pain with RLE radicular symptoms distally to foot; weakness in hip mm, slight decrease in right hip ER with piriformis and psoas myofascial tightness/pain .       Tracy is progressing well towards her goals.   Pt prognosis is good    Pt will continue to benefit from skilled outpatient physical therapy to address the deficits listed in the problem list box on initial evaluation, provide pt/family education and to maximize pt's level of independence in the home and community environment.     Pt's spiritual, cultural and educational needs considered and pt agreeable to plan of care and goals.     Anticipated barriers to physical therapy: none    Goals:   Short Term Goals: 3 weeks  Pain: reduction in worst pain c/o's to no more  than 5/10   Walking: able to walk 1.5 miles without increased RLE pain or compensation   Demonstrate compliance with initial exercise program     Long Term Goals: 6 weeks     Pain: Decrease pain to no more than 2/10 to allow for improved ability to perform all of her usual daily/recreational activities   Strength: Improve strength in core/hip muscles to 5/5 for improved lumbopelvic stability  ROM: Able to perform lumbar and bilateral hip AROM with functional ROM in all planes without increased pain or compensation    Functional scale: Improve score on FOTO  to 41% limitation   Lifting: Lift 20 lbs to waist level, 10 lbs to shoulder level, 5 lbs to overhead without pain or compensation  Walking: Increase walking distance  to 3 miles without pain   Postures: Increase sitting and/or standing duration to 30 mins without pain   Transfers: Perform all transfers without increased pain or limitation  Exercise: demonstrate independence with home exercise program to maintain gains made in therapy.      PLAN   Continue with POC    Certification Period: 5/5/2022 to 7/10/2022.     Outpatient Physical Therapy 2 times weekly for 6 weeks to include the following interventions: patient education, Gait Training, Manual Therapy, Moist Heat/ Ice, Neuromuscular Re-ed, Patient Education, Therapeutic Activities, Therapeutic Exercise and Cold Laser, Dry Needling or other modalities as needed for reduction in inflammation .   Pt may be seen by PTA as part of the rehabilitation team.   Alexandra Mitchell, PT

## 2022-05-20 ENCOUNTER — CLINICAL SUPPORT (OUTPATIENT)
Dept: REHABILITATION | Facility: HOSPITAL | Age: 67
End: 2022-05-20
Attending: NURSE PRACTITIONER
Payer: MEDICARE

## 2022-05-20 DIAGNOSIS — M54.40 BACK PAIN OF LUMBAR REGION WITH SCIATICA: ICD-10-CM

## 2022-05-20 DIAGNOSIS — R26.89 DECREASED FUNCTIONAL MOBILITY: Primary | ICD-10-CM

## 2022-05-20 PROCEDURE — 97140 MANUAL THERAPY 1/> REGIONS: CPT | Mod: PN

## 2022-05-20 NOTE — PROGRESS NOTES
[facility]   Physical Therapy Treatment Note     Name: Tracy Meyer  Clinic Number: [id number]    Therapy Diagnosis:   Encounter Diagnoses   Name Primary?    Decreased functional mobility Yes    Back pain of lumbar region with sciatica      Physician: Chapito Woodson FNP    Visit Date: 5/20/2022    Physician Orders: PT Eval and Treat   Medical Diagnosis: Right hip pain   Evaluation Date: 5/5/2022  Authorization period Expiration: 12/31/2022  Plan of Care Certification Period: 7/30/2022    Visit #: 5 / Visits authorized: 12    PTA Visit #: 0/5     Time In:  1:45   pm  Time Out: 2:35   pm  Total Billable Time: 40 minutes    SUBJECTIVE     Pt reports: Having some right lateral buttock pain today - has been sitting for awhile this morning;  still present in buttocks extending to right knee; right foot is still partially numb, but not all of the time.   She is compliant with home exercise program.  Response to previous treatment: good - Tracy reports that she has a little bit more pain in the back of her hip, but overall much better    Functional change: able to walk 2 miles this morning without pain; able to play total of 18 holes of golf without any increased pain.     Pain: 4 /10  Location: Radicular symptoms: Right buttock - piriformis, quadratus femoris     OBJECTIVE     Objective Measures updated at progress report unless specified.     Treatment     Tracy received the treatments listed below:      therapeutic exercises to develop strength and range of motion for 0 minutes including:  SKC   Piriformis stretch  Psoas stretch    manual therapy techniques:  for 30 minutes, including:  IASTM to right lateral hip/piriformis/posterior hip mm and distally into ITB all of the was past fibular head; Trigger point release along positive areas on right piriformis mm; STM deep into quadratus femoris mm; Hip ER strengthening in S/L     Applied Kineseotape to lift off tissue overall posterior hip -  to reduce irritation; Also placed I-strip posterior and anterior fibers of gluteal medius mm for facilitation during gait.     Had Tracy walk around clinic with tape: noted improved excursion from hip extension to flexion on both LE's - without increased pain.       direct contact modalities after being cleared for contraindications:  DID NOT PREFORM   LLLT 810 laser along the sciatic nerve right leg from hip to knee 30 joules x 6 min        Patient Education and Home Exercises     Home Exercises Provided and Patient Education Provided     Education provided:   Myofascial release    Written Home Exercises Provided: None. Exercises were reviewed and Tracy was able to demonstrate them prior to the end of the session.  Tracy demonstrated good understanding of the education provided. See EMR under Patient Instructions for exercises provided during therapy sessions    ASSESSMENT   Tracy is reporting consistent decrease in pain with activity; Feel that continued STM - deep into ER mm with strengthening of the same will continue to improve her condition.     Tracy is a 66 y.o. female referred to outpatient physical therapy with a medical diagnosis of right hip pain, and presents to PT with lumbar pain with RLE radicular symptoms distally to foot; weakness in hip mm, slight decrease in right hip ER with piriformis and psoas myofascial tightness/pain .       Tracy is progressing well towards her goals.   Pt prognosis is good    Pt will continue to benefit from skilled outpatient physical therapy to address the deficits listed in the problem list box on initial evaluation, provide pt/family education and to maximize pt's level of independence in the home and community environment.     Pt's spiritual, cultural and educational needs considered and pt agreeable to plan of care and goals.     Anticipated barriers to physical therapy: none    Goals:   Short Term Goals: 3 weeks  Pain: reduction in worst pain c/o's to no more than  5/10   Walking: able to walk 1.5 miles without increased RLE pain or compensation   Demonstrate compliance with initial exercise program     Long Term Goals: 6 weeks     Pain: Decrease pain to no more than 2/10 to allow for improved ability to perform all of her usual daily/recreational activities   Strength: Improve strength in core/hip muscles to 5/5 for improved lumbopelvic stability  ROM: Able to perform lumbar and bilateral hip AROM with functional ROM in all planes without increased pain or compensation    Functional scale: Improve score on FOTO  to 41% limitation   Lifting: Lift 20 lbs to waist level, 10 lbs to shoulder level, 5 lbs to overhead without pain or compensation  Walking: Increase walking distance  to 3 miles without pain   Postures: Increase sitting and/or standing duration to 30 mins without pain   Transfers: Perform all transfers without increased pain or limitation  Exercise: demonstrate independence with home exercise program to maintain gains made in therapy.      PLAN   Continue with STM, IASTM and strengthening - modalities as needed.     Certification Period: 5/5/2022 to 7/10/2022.     Outpatient Physical Therapy 2 times weekly for 6 weeks to include the following interventions: patient education, Gait Training, Manual Therapy, Moist Heat/ Ice, Neuromuscular Re-ed, Patient Education, Therapeutic Activities, Therapeutic Exercise and Cold Laser, Dry Needling or other modalities as needed for reduction in inflammation .   Pt may be seen by PTA as part of the rehabilitation team.   Alexandra Mitchell, PT

## 2022-05-24 ENCOUNTER — CLINICAL SUPPORT (OUTPATIENT)
Dept: REHABILITATION | Facility: HOSPITAL | Age: 67
End: 2022-05-24
Attending: NURSE PRACTITIONER
Payer: MEDICARE

## 2022-05-24 DIAGNOSIS — M54.40 BACK PAIN OF LUMBAR REGION WITH SCIATICA: ICD-10-CM

## 2022-05-24 DIAGNOSIS — R26.89 DECREASED FUNCTIONAL MOBILITY: Primary | ICD-10-CM

## 2022-05-24 PROCEDURE — 97110 THERAPEUTIC EXERCISES: CPT | Mod: PN,CQ

## 2022-05-24 PROCEDURE — 97140 MANUAL THERAPY 1/> REGIONS: CPT | Mod: PN,CQ

## 2022-05-24 NOTE — PROGRESS NOTES
[facility]   Physical Therapy Treatment Note     Name: Tracy Meyer  Clinic Number: [id number]    Therapy Diagnosis:   Encounter Diagnoses   Name Primary?    Decreased functional mobility Yes    Back pain of lumbar region with sciatica      Physician: Chapito Woodson FNP    Visit Date: 5/24/2022    Physician Orders: PT Eval and Treat   Medical Diagnosis: Right hip pain   Evaluation Date: 5/5/2022  Authorization period Expiration: 12/31/2022  Plan of Care Certification Period: 7/30/2022    Visit #: 6 / Visits authorized: 12    PTA Visit #: 1/5     Time In:  2:50   pm  Time Out: 2:35   pm  Total Billable Time: 40 minutes    SUBJECTIVE     Pt reports: being in a little more pain since last treatment and has mild bruising on the upper thigh.  The KT taping seemed to aggravate things, so, took it off.  She is compliant with home exercise program.  Response to previous treatment: good at first but didn't last as long  Functional change: able to walk 2 miles this morning without pain; able to play total of 18 holes of golf without any increased pain.     Pain: 6 /10  Location: Radicular symptoms: Right buttock - piriformis, quadratus femoris     OBJECTIVE     Objective Measures updated at progress report unless specified.     Treatment     Tracy received the treatments listed below:      therapeutic exercises to develop strength and range of motion for 10 minutes including:    New exercise   S/L clams with black thera loop in full tension biateral    manual therapy techniques:  for 30 minutes, including:  Myofascial release   Right piriformis   Right quad superficial   Right ITB superficial    Right psoas    Energy muscle technique to offset adductor     KT taping to the right tarsal tunnel to reduce sciatic radiculopathy.          direct contact modalities after being cleared for contraindications:  DID NOT PREFORM   LLLT 810 laser along the sciatic nerve right leg from hip to knee 30 joules  x 6 min        Patient Education and Home Exercises     Home Exercises Provided and Patient Education Provided     Education provided:   Myofascial release    Written Home Exercises Provided: None. Exercises were reviewed and Tracy was able to demonstrate them prior to the end of the session.  Tracy demonstrated good understanding of the education provided. See EMR under Patient Instructions for exercises provided during therapy sessions    ASSESSMENT   Tracy is reporting less tension, pain but re-synergies with ADL's and is getting poor carry over between treatments.    Tracy is a 66 y.o. female referred to outpatient physical therapy with a medical diagnosis of right hip pain, and presents to PT with lumbar pain with RLE radicular symptoms distally to foot; weakness in hip mm, slight decrease in right hip ER with piriformis and psoas myofascial tightness/pain .       Tracy is progressing well towards her goals.   Pt prognosis is good    Pt will continue to benefit from skilled outpatient physical therapy to address the deficits listed in the problem list box on initial evaluation, provide pt/family education and to maximize pt's level of independence in the home and community environment.     Pt's spiritual, cultural and educational needs considered and pt agreeable to plan of care and goals.     Anticipated barriers to physical therapy: none    Goals:   Short Term Goals: 3 weeks  Pain: reduction in worst pain c/o's to no more than 5/10   Walking: able to walk 1.5 miles without increased RLE pain or compensation   Demonstrate compliance with initial exercise program     Long Term Goals: 6 weeks     Pain: Decrease pain to no more than 2/10 to allow for improved ability to perform all of her usual daily/recreational activities   Strength: Improve strength in core/hip muscles to 5/5 for improved lumbopelvic stability  ROM: Able to perform lumbar and bilateral hip AROM with functional ROM in all planes without  increased pain or compensation    Functional scale: Improve score on FOTO  to 41% limitation   Lifting: Lift 20 lbs to waist level, 10 lbs to shoulder level, 5 lbs to overhead without pain or compensation  Walking: Increase walking distance  to 3 miles without pain   Postures: Increase sitting and/or standing duration to 30 mins without pain   Transfers: Perform all transfers without increased pain or limitation  Exercise: demonstrate independence with home exercise program to maintain gains made in therapy.      PLAN   Continue with STM, IASTM and strengthening - modalities as needed.     Certification Period: 5/5/2022 to 7/10/2022.     Outpatient Physical Therapy 2 times weekly for 6 weeks to include the following interventions: patient education, Gait Training, Manual Therapy, Moist Heat/ Ice, Neuromuscular Re-ed, Patient Education, Therapeutic Activities, Therapeutic Exercise and Cold Laser, Dry Needling or other modalities as needed for reduction in inflammation .   Pt may be seen by PTA as part of the rehabilitation team.   Chaim Oakes PTA

## 2022-05-27 ENCOUNTER — CLINICAL SUPPORT (OUTPATIENT)
Dept: REHABILITATION | Facility: HOSPITAL | Age: 67
End: 2022-05-27
Attending: NURSE PRACTITIONER
Payer: MEDICARE

## 2022-05-27 DIAGNOSIS — R26.89 DECREASED FUNCTIONAL MOBILITY: Primary | ICD-10-CM

## 2022-05-27 DIAGNOSIS — M54.40 BACK PAIN OF LUMBAR REGION WITH SCIATICA: ICD-10-CM

## 2022-05-27 PROCEDURE — 97110 THERAPEUTIC EXERCISES: CPT | Mod: PN

## 2022-05-27 NOTE — PROGRESS NOTES
"[facility]   Physical Therapy Treatment Note     Name: Tracy Meyer  Clinic Number: [id number]    Therapy Diagnosis:   Encounter Diagnoses   Name Primary?    Decreased functional mobility Yes    Back pain of lumbar region with sciatica      Physician: Chapito Woodson FNP    Visit Date: 5/27/2022    Physician Orders: PT Eval and Treat   Medical Diagnosis: Right hip pain   Evaluation Date: 5/5/2022  Authorization period Expiration: 12/31/2022  Plan of Care Certification Period: 7/30/2022    Visit #: 7 / Visits authorized: 12    PTA Visit #: 0/5     Time In:  2:50   pm  Time Out: 2:35   pm  Total Billable Time: 40 minutes    SUBJECTIVE     Pt reports: "I'm really in a bad way today, my right gluteal to anterior hip area is painful, grabbing and I feel a "pulling" sensation toward my knee - also c/o increased numbness/tingling in her foot with clam exercises.  Tracy also stated that she was very sore - anterior hip/groin from the psoas releases   She is compliant with home exercise program.  Response to previous treatment: piriformis releases always felt good and possibly beneficial, but the ones in the front of my hip are just painful   Functional change: Tracy continues to walk her 2 miles on a daily basis, does her stretching exercises sometimes more than once a day     Pain: 6 /10  Location: Radicular symptoms: Right buttock - piriformis, anterior thigh     OBJECTIVE     Objective Measures updated at progress report unless specified.     Treatment     Tracy received the treatments listed below:   Reviewed all of the activities Tracy has been doing at home - she demonstrated her stretches - she is over stretching - tissue that is not tight; She demonstrates weakness in her core/hip girdle mm - gets numbness in her right foot with clam shell exercises.      We agreed that she needs to focus on Strengthening, she will quit the aggressive stretching, we will suspend "releases" here in the " clinic.  Below is her new HEP - demonstrated all exercises with some cueing for technique.     therapeutic exercises to develop strength and range of motion for minutes including:    Bridges x 10   Leg Lowering - SLR - control heel to the ground maintaining pelvic alignment 10 x eac  Dead Bugs x 10  S/L hip abduction x 10  Front Planks - 15 sec > 30 sec x 2  Side Planks - 15 sec > 30 sec x 2  Prone - alternate arm and leg x 10  Quadruped alternate arm and leg x 10   Squats - thighs // to ground and back up     Discussed her walking program - needs to try and stay on level part of the street - avoid walking too much on edge of road where one foot in higher than the other.     manual therapy techniques:        direct contact modalities after being cleared for contraindications:  DID NOT PREFORM   LLLT 810 laser along the sciatic nerve right leg from hip to knee 30 joules x 6 min        Patient Education and Home Exercises     Home Exercises Provided and Patient Education Provided     Education provided:   Stability versus Mobility - feel Tracy needs more Stability at this time - needs improved sacral/ischia/femoral support.     Written Home Exercises Provided: Tracy was given a written HEP  Exercises were reviewed and Tracy was able to demonstrate them prior to the end of the session.  Tracy demonstrated good understanding of the education provided. See Media under Patient Instructions for exercises provided during therapy sessions    ASSESSMENT   Tracy came in reporting continued pain, very sore all around her anterior hip/groin; Decision made to go in a different direction - focus on strengthening core/hip girdle mm.  Tracy has a beginning exericse program to follow for the next 2 weeks.  She is to do exercises 1x/day only along with her continued walking/golf; She is to start water aerobics next week as well.  Instructed her to call if she has any questions or difficulties with exercises.  Will see her back  sometime the week of June 13th.     Tracy is a 66 y.o. female referred to outpatient physical therapy with a medical diagnosis of right hip pain, and presents to PT with lumbar pain with RLE radicular symptoms distally to foot; weakness in hip mm, slight decrease in right hip ER with piriformis and psoas myofascial tightness/pain .       Tracy is progressing well towards her goals.   Pt prognosis is good    Pt will continue to benefit from skilled outpatient physical therapy to address the deficits listed in the problem list box on initial evaluation, provide pt/family education and to maximize pt's level of independence in the home and community environment.     Pt's spiritual, cultural and educational needs considered and pt agreeable to plan of care and goals.     Anticipated barriers to physical therapy: none    Goals:   Short Term Goals: 3 weeks  Pain: reduction in worst pain c/o's to no more than 5/10   Walking: able to walk 1.5 miles without increased RLE pain or compensation   Demonstrate compliance with initial exercise program     Long Term Goals: 6 weeks     Pain: Decrease pain to no more than 2/10 to allow for improved ability to perform all of her usual daily/recreational activities   Strength: Improve strength in core/hip muscles to 5/5 for improved lumbopelvic stability  ROM: Able to perform lumbar and bilateral hip AROM with functional ROM in all planes without increased pain or compensation    Functional scale: Improve score on FOTO  to 41% limitation   Lifting: Lift 20 lbs to waist level, 10 lbs to shoulder level, 5 lbs to overhead without pain or compensation  Walking: Increase walking distance  to 3 miles without pain   Postures: Increase sitting and/or standing duration to 30 mins without pain   Transfers: Perform all transfers without increased pain or limitation  Exercise: demonstrate independence with home exercise program to maintain gains made in therapy.      PLAN   Return for next visit  week of June 13th - HEP until then.      Certification Period: 5/5/2022 to 7/10/2022.     Outpatient Physical Therapy 2 times weekly for 6 weeks to include the following interventions: patient education, Gait Training, Manual Therapy, Moist Heat/ Ice, Neuromuscular Re-ed, Patient Education, Therapeutic Activities, Therapeutic Exercise and Cold Laser, Dry Needling or other modalities as needed for reduction in inflammation .   Pt may be seen by PTA as part of the rehabilitation team.   Alexandra Mitchell, PT

## 2022-06-13 ENCOUNTER — CLINICAL SUPPORT (OUTPATIENT)
Dept: REHABILITATION | Facility: HOSPITAL | Age: 67
End: 2022-06-13
Attending: NURSE PRACTITIONER
Payer: MEDICARE

## 2022-06-13 DIAGNOSIS — M54.40 BACK PAIN OF LUMBAR REGION WITH SCIATICA: ICD-10-CM

## 2022-06-13 DIAGNOSIS — R26.89 DECREASED FUNCTIONAL MOBILITY: Primary | ICD-10-CM

## 2022-06-13 PROCEDURE — 97110 THERAPEUTIC EXERCISES: CPT | Mod: PN

## 2022-06-13 PROCEDURE — 97140 MANUAL THERAPY 1/> REGIONS: CPT | Mod: PN

## 2022-06-13 NOTE — PROGRESS NOTES
[facility]   Physical Therapy Treatment Note     Name: Tracy Meyer  Clinic Number: [id number]    Therapy Diagnosis:   Encounter Diagnoses   Name Primary?    Decreased functional mobility Yes    Back pain of lumbar region with sciatica      Physician: Chapito Woodson FNP    Visit Date: 6/13/2022    Physician Orders: PT Eval and Treat   Medical Diagnosis: Right hip pain   Evaluation Date: 5/5/2022  Authorization period Expiration: 12/31/2022  Plan of Care Certification Period: 7/30/2022    FOTO:  2/3     Visit #: 8  / Visits authorized: 12    PTA Visit #: 0/5     Time In:  8:30 am   Time Out: 9:00 am   Total Billable Time: 30 minutes    SUBJECTIVE     Pt reports: I was doing really well the first week after my last visit; I was having almost no pain in my right buttock and hip; I started water aerobic classes last week and now I'm having increased pain in my piriformis and posterior to anterior right hip area again;  Tracy denies any pain in her right lower leg, also notes no return of numbness in her right foot.   She is compliant with home exercise program.  Response to previous treatment: less pain for the week following her last visit - then started water exercises - LE exercises flared up her hip pain again.   Functional change: Tracy is able to walk up to 3 miles now without increased pain; no numbness in right foot; Water aerobics - RLE hip flexion/abduction exercises flared up her pain again.     Pain: 4 /10  Location: Radicular symptoms: Right buttock - piriformis, anterior thigh     OBJECTIVE     Objective Measures updated at progress report unless specified.     Treatment     Tracy received the treatments listed below:   Advised Tracy to continue with her walking program, HEP and golfing as this does not increase her pain at all now; If she wants to continue with the Water aerobics - she needs to decrease the ROM excursion with the hip ex or eliminate them all together and  "just perform UE exercises while doing low height marches to maintain the aerobic part of exercises.   Cautioned her about performing any exercises that flare-up her hip pain.     Reviewed all of the activities Tracy has been doing at home - she demonstrated her stretches - she is over stretching - tissue that is not tight; She demonstrates weakness in her core/hip girdle mm - gets numbness in her right foot with clam shell exercises.      We agreed that she needs to focus on Strengthening, she will quit the aggressive stretching, we will suspend "releases" here in the clinic.  Below is her new HEP - demonstrated all exercises with some cueing for technique.     therapeutic exercises to develop strength and range of motion for minutes including:    Bridges x 10   Leg Lowering - SLR - control heel to the ground maintaining pelvic alignment 10 x eac  Dead Bugs x 10  S/L hip abduction x 10  Front Planks - 15 sec > 30 sec x 2  Side Planks - 15 sec > 30 sec x 2  Prone - alternate arm and leg x 10  Quadruped alternate arm and leg x 10   Squats - thighs // to ground and back up         manual therapy techniques:   STM to right piriformis using "spiked" ball - along piriformis, gluteal medius and minimus, quadratus femoris and anterior hip mm x 10 mins;  Contract/Releax to right hip mm into flex/ext and abd/add x 5 mins       direct contact modalities after being cleared for contraindications:   LLLT 810 laser along the  Piriformis and gluteal mm. Right hip - 30 joules x 6 min        Patient Education and Home Exercises     Home Exercises Provided and Patient Education Provided     Education provided:   Stability versus Mobility - feel Tracy needs more Stability at this time - needs improved sacral/ischia/femoral support.     Written Home Exercises Provided: Tracy was given a written HEP  Exercises were reviewed and Tracy was able to demonstrate them prior to the end of the session.  Tracy demonstrated good understanding of " the education provided. See Media under Patient Instructions for exercises provided during therapy sessions    ASSESSMENT   Tracy came in reporting she had no pain at all during her first week after last visit when she stuck with walking and HEP; starting the water Ex last week - flared up her Right hip pain - she stated it was the hip flex/abduction exercises that caused the irritation. Advised her to return to the activity that is pain free; If she wants to do water ex will need to modify.     Tracy is a 66 y.o. female referred to outpatient physical therapy with a medical diagnosis of right hip pain, and presents to PT with lumbar pain with RLE radicular symptoms distally to foot; weakness in hip mm, slight decrease in right hip ER with piriformis and psoas myofascial tightness/pain .       Tracy is progressing well towards her goals.   Pt prognosis is good    Pt will continue to benefit from skilled outpatient physical therapy to address the deficits listed in the problem list box on initial evaluation, provide pt/family education and to maximize pt's level of independence in the home and community environment.     Pt's spiritual, cultural and educational needs considered and pt agreeable to plan of care and goals.     Anticipated barriers to physical therapy: none    Goals:   Short Term Goals: 3 weeks  Pain: reduction in worst pain c/o's to no more than 5/10   Walking: able to walk 1.5 miles without increased RLE pain or compensation   Demonstrate compliance with initial exercise program     Long Term Goals: 6 weeks     Pain: Decrease pain to no more than 2/10 to allow for improved ability to perform all of her usual daily/recreational activities   Strength: Improve strength in core/hip muscles to 5/5 for improved lumbopelvic stability  ROM: Able to perform lumbar and bilateral hip AROM with functional ROM in all planes without increased pain or compensation    Functional scale: Improve score on FOTO  to 41%  limitation   Lifting: Lift 20 lbs to waist level, 10 lbs to shoulder level, 5 lbs to overhead without pain or compensation  Walking: Increase walking distance  to 3 miles without pain   Postures: Increase sitting and/or standing duration to 30 mins without pain   Transfers: Perform all transfers without increased pain or limitation  Exercise: demonstrate independence with home exercise program to maintain gains made in therapy.      PLAN   Return again next week -     Certification Period: 5/5/2022 to 7/10/2022.     Outpatient Physical Therapy 2 times weekly for 6 weeks to include the following interventions: patient education, Gait Training, Manual Therapy, Moist Heat/ Ice, Neuromuscular Re-ed, Patient Education, Therapeutic Activities, Therapeutic Exercise and Cold Laser, Dry Needling or other modalities as needed for reduction in inflammation .   Pt may be seen by PTA as part of the rehabilitation team.     Alexandra Mitchell, PT

## 2022-06-20 ENCOUNTER — CLINICAL SUPPORT (OUTPATIENT)
Dept: REHABILITATION | Facility: HOSPITAL | Age: 67
End: 2022-06-20
Attending: NURSE PRACTITIONER
Payer: MEDICARE

## 2022-06-20 DIAGNOSIS — M54.40 BACK PAIN OF LUMBAR REGION WITH SCIATICA: ICD-10-CM

## 2022-06-20 DIAGNOSIS — R26.89 DECREASED FUNCTIONAL MOBILITY: Primary | ICD-10-CM

## 2022-06-20 PROCEDURE — 97140 MANUAL THERAPY 1/> REGIONS: CPT | Mod: PN

## 2022-06-20 PROCEDURE — 97110 THERAPEUTIC EXERCISES: CPT | Mod: PN

## 2022-06-20 NOTE — PROGRESS NOTES
"[facility]   Physical Therapy Treatment Note     Name: Tracy Meyer  Clinic Number: [id number]    Therapy Diagnosis:   Encounter Diagnoses   Name Primary?   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   She is compliant with home exercise program.  Response to previous treatment: Much less pain, No tingling in right lower leg; very infrequent tingling in her right foot. Still with tenderness posterior right hip.    Functional change: Tracy is able to walk up to 3 miles now without increased pain; no numbness in right foot; feels that prone and quadruped alternate arm and leg exercises are irritating her right buttock/hip.      Pain: 2 /10  Location: Radicular symptoms: Right - posterior hip - insertion of piriformis, gluteal minimum, quadratus femoris     OBJECTIVE     Objective Measures updated at progress report unless specified.     Treatment     Tracy received the treatments listed below:   Advised Tracy to continue with her walking program, HEP and golfing as this does not increase her pain at all now; I  Added:  Standing PNF - flexion/extension for shoulders to address upper core weakness and improve stabilization of lower core - yellow and green tband   Standing: shoulder shrugs - middle/lower traps   Standing: paloff presses: green " "tband   Clams/reverse clams - as long as they do not flare-up her hip - tested in clinic and she was ok with them.     Reviewed all of the activities Tracy has been doing at home - she demonstrated her stretches - she is over stretching - tissue that is not tight; She demonstrates weakness in her core/hip girdle mm - gets numbness in her right foot with clam shell exercises.      We agreed that she needs to focus on Strengthening, she will quit the aggressive stretching, we will suspend "releases" here in the clinic.  Below is her new HEP - demonstrated all exercises with some cueing for technique.     therapeutic exercises to develop strength and range of motion for minutes including:    Bridges x 10   Leg Lowering - SLR - control heel to the ground maintaining pelvic alignment 10 x eac  Dead Bugs x 10  S/L hip abduction x 10  Front Planks - 15 sec > 30 sec x 2  Side Planks - 15 sec > 30 sec x 2  Squats - thighs // to ground and back up   Added:  Standing PNF - flexion/extension for shoulders to address upper core weakness and improve stabilization of lower core - yellow and green tband   Standing: shoulder shrugs - middle/lower traps   Standing: paloff presses: green tband   Clams/reverse clams - as long as they do not flare-up her hip - tested in clinic and she was ok with them.         manual therapy techniques:   STM to right piriformis using "spiked" ball - along piriformis, gluteal medius and minimus, quadratus femoris and anterior hip mm x 10 mins;  Contract/Releax to right hip mm into flex/ext and abd/add x 5 mins       direct contact modalities after being cleared for contraindications:   LLLT 810 laser along the  Piriformis and gluteal mm. Right hip - 30 joules x 6 min        Patient Education and Home Exercises     Home Exercises Provided and Patient Education Provided     Education provided:   Stability versus Mobility - feel Tracy needs more Stability at this time - needs improved sacral/ischia/femoral " support.     Written Home Exercises Provided: Tracy was given a written HEP  Exercises were reviewed and Tracy was able to demonstrate them prior to the end of the session.  Tracy demonstrated good understanding of the education provided. See Media under Patient Instructions for exercises provided during therapy sessions    ASSESSMENT   Tracy continues to improve with modification in her exercise program.  She has been walking 3 miles, golfing, without pain; very infrequent numbness in right foot; eliminated alternate arm/leg exercises due to increased posterior hip pain, added in some more general core exercise for UE/LE.      Tracy is a 66 y.o. female referred to outpatient physical therapy with a medical diagnosis of right hip pain, and presents to PT with lumbar pain with RLE radicular symptoms distally to foot; weakness in hip mm, slight decrease in right hip ER with piriformis and psoas myofascial tightness/pain .       Tracy is progressing well towards her goals.   Pt prognosis is good    Pt will continue to benefit from skilled outpatient physical therapy to address the deficits listed in the problem list box on initial evaluation, provide pt/family education and to maximize pt's level of independence in the home and community environment.     Pt's spiritual, cultural and educational needs considered and pt agreeable to plan of care and goals.     Anticipated barriers to physical therapy: none    Goals:   Short Term Goals: 3 weeks  Pain: reduction in worst pain c/o's to no more than 5/10   Walking: able to walk 1.5 miles without increased RLE pain or compensation   Demonstrate compliance with initial exercise program     Long Term Goals: 6 weeks     Pain: Decrease pain to no more than 2/10 to allow for improved ability to perform all of her usual daily/recreational activities   Strength: Improve strength in core/hip muscles to 5/5 for improved lumbopelvic stability  ROM: Able to perform lumbar and bilateral  hip AROM with functional ROM in all planes without increased pain or compensation    Functional scale: Improve score on FOTO  to 41% limitation   Lifting: Lift 20 lbs to waist level, 10 lbs to shoulder level, 5 lbs to overhead without pain or compensation  Walking: Increase walking distance  to 3 miles without pain   Postures: Increase sitting and/or standing duration to 30 mins without pain   Transfers: Perform all transfers without increased pain or limitation  Exercise: demonstrate independence with home exercise program to maintain gains made in therapy.      PLAN   Will see back in two weeks for re-check.     Certification Period: 5/5/2022 to 7/10/2022.     Outpatient Physical Therapy 2 times weekly for 6 weeks to include the following interventions: patient education, Gait Training, Manual Therapy, Moist Heat/ Ice, Neuromuscular Re-ed, Patient Education, Therapeutic Activities, Therapeutic Exercise and Cold Laser, Dry Needling or other modalities as needed for reduction in inflammation .   Pt may be seen by PTA as part of the rehabilitation team.     Alexandra Mitchell, PT

## 2022-07-06 ENCOUNTER — CLINICAL SUPPORT (OUTPATIENT)
Dept: REHABILITATION | Facility: HOSPITAL | Age: 67
End: 2022-07-06
Attending: NURSE PRACTITIONER
Payer: MEDICARE

## 2022-07-06 DIAGNOSIS — R26.89 DECREASED FUNCTIONAL MOBILITY: Primary | ICD-10-CM

## 2022-07-06 DIAGNOSIS — M54.40 BACK PAIN OF LUMBAR REGION WITH SCIATICA: ICD-10-CM

## 2022-07-06 PROCEDURE — 97110 THERAPEUTIC EXERCISES: CPT | Mod: PN

## 2022-07-06 NOTE — PROGRESS NOTES
"[facility]   Physical Therapy Treatment Note / Discharge Summary    Name: Tracy Meyer  Clinic Number: [id number]    Therapy Diagnosis:   Encounter Diagnoses   Name Primary?    Decreased functional mobility Yes    Back pain of lumbar region with sciatica      Physician: Chapito Woodson FNP    Visit Date: 7/6/2022    Physician Orders: PT Eval and Treat   Medical Diagnosis: Right hip pain   Evaluation Date: 5/5/2022  Authorization period Expiration: 12/31/2022  Plan of Care Certification Period: 7/30/2022    FOTO:  3/3  - scored at 84% max function     Visit #: 10  / Visits authorized: 12    PTA Visit #: 0/5     Time In:  10:00  am   Time Out: 10:30 am   Total Billable Time: 25 minutes    SUBJECTIVE     Pt reports: "I am better"  Tracy has been doing all of her exercises at home, walking 2-3 miles, having no pain in right hip/LE at all.   She is compliant with home exercise program.  Response to previous treatment: Much less pain, No tingling in right lower leg; very infrequent tingling in her right foot. Still with tenderness posterior right hip.    Functional change: Tracy is able to walk up to 2-3 miles now without increased pain; no numbness in right foot;  Walking just 2 miles over the past couple of weeks as she hasn't been feeling well.     Pain: 0 /10  Location: Radicular symptoms: Right - posterior hip - insertion of piriformis, gluteal minimum, quadratus femoris     OBJECTIVE     Objective Measures updated at progress report unless specified.     Treatment     Tracy received the treatments listed below:    Reviewed all exercises with Tracy today - needed a little refresher on the Paloff Presses and progression as well as PNF UE exercise.  Able to demonstrate good technique after cueing.     Advised Tracy to continue with her walking program, HEP and golfing as this does not increase her pain at all now; I  Added:  Standing PNF - flexion/extension for shoulders to address upper " core weakness and improve stabilization of lower core - yellow and green tband   Standing: shoulder shrugs - middle/lower traps   Standing: paloff presses: green tband   Clams/reverse clams - as long as they do not flare-up her hip - tested in clinic and she was ok with them    Bridges x 10   Leg Lowering - SLR - control heel to the ground maintaining pelvic alignment 10 x eac  Dead Bugs x 10  S/L hip abduction x 10   Side Planks - 15 sec > 30 sec x 2  Squats - thighs // to ground and back up   Added:  Standing PNF - flexion/extension for shoulders to address upper core weakness and improve stabilization of lower core - yellow and green tband   Standing: shoulder shrugs - middle/lower traps   Standing: paloff presses: green tband   Clams/reverse clams - as long as they do not flare-up her hip - tested in clinic and she was ok with them.         Patient Education and Home Exercises     Home Exercises Provided and Patient Education Provided     Education provided:   Stability versus Mobility - feel Tracy needs more Stability at this time - needs improved sacral/ischia/femoral support.     Written Home Exercises Provided: Tracy was given a written HEP  Exercises were reviewed and Tracy was able to demonstrate them prior to the end of the session.  Tracy demonstrated good understanding of the education provided. See Media under Patient Instructions for exercises provided during therapy sessions    ASSESSMENT   Tracy has been able to return to all of her usual activities for past 2 weeks without any reoccurrence of right hip pain, no RLE radicular pain, no lower leg numbness.   She has been walking 3 miles, golfing, without pain. She is Independent with her HEP. Reviewed all exercises with her today - she feels confident with program.     Pt prognosis is good    Pt will continue to benefit from skilled outpatient physical therapy to address the deficits listed in the problem list box on initial evaluation, provide  pt/family education and to maximize pt's level of independence in the home and community environment.     Pt's spiritual, cultural and educational needs considered and pt agreeable to plan of care and goals.     Anticipated barriers to physical therapy: none    Goals:   Short Term Goals: 3 weeks  Pain: reduction in worst pain c/o's to no more than 5/10  > MET  Walking: able to walk 1.5 miles without increased RLE pain or compensation  > MET   Demonstrate compliance with initial exercise program > MET      Long Term Goals: 6 weeks     Pain: Decrease pain to no more than 2/10 to allow for improved ability to perform all of her usual daily/recreational activities > MET  Strength: Improve strength in core/hip muscles to 5/5 for improved lumbopelvic stability > MET   ROM: Able to perform lumbar and bilateral hip AROM with functional ROM in all planes without increased pain or compensation  > MET   Functional scale: Improve score on FOTO  to 41% limitation  - Exceeded FOTO score - 16% limitation   Lifting: Lift 20 lbs to waist level, 10 lbs to shoulder level, 5 lbs to overhead without pain or compensation > MET  Walking: Increase walking distance  to 3 miles without pain > MET   Postures: Increase sitting and/or standing duration to 30 mins without pain > MET  Transfers: Perform all transfers without increased pain or limitation > MET  Exercise: demonstrate independence with home exercise program to maintain gains made in therapy.  > MET    PLAN   Discharge from PT     Certification Period: 5/5/2022 to 7/10/2022.     Alexandra Mitchell, PT

## 2023-08-28 ENCOUNTER — OFFICE VISIT (OUTPATIENT)
Dept: URGENT CARE | Facility: CLINIC | Age: 68
End: 2023-08-28
Payer: MEDICARE

## 2023-08-28 VITALS
HEIGHT: 66 IN | BODY MASS INDEX: 17.36 KG/M2 | SYSTOLIC BLOOD PRESSURE: 122 MMHG | OXYGEN SATURATION: 98 % | WEIGHT: 108 LBS | DIASTOLIC BLOOD PRESSURE: 64 MMHG | HEART RATE: 73 BPM | TEMPERATURE: 98 F

## 2023-08-28 DIAGNOSIS — R35.0 FREQUENT URINATION: Primary | ICD-10-CM

## 2023-08-28 LAB
BILIRUB UR QL STRIP: NEGATIVE
GLUCOSE UR QL STRIP: NEGATIVE
KETONES UR QL STRIP: NEGATIVE
LEUKOCYTE ESTERASE UR QL STRIP: NEGATIVE
PH, POC UA: 7.5
POC BLOOD, URINE: NEGATIVE
POC NITRATES, URINE: NEGATIVE
PROT UR QL STRIP: NEGATIVE
SP GR UR STRIP: 1.01 (ref 1–1.03)
UROBILINOGEN UR STRIP-ACNC: NORMAL (ref 0.1–1.1)

## 2023-08-28 PROCEDURE — 99203 PR OFFICE/OUTPT VISIT, NEW, LEVL III, 30-44 MIN: ICD-10-PCS | Mod: S$GLB,,, | Performed by: NURSE PRACTITIONER

## 2023-08-28 PROCEDURE — 99203 OFFICE O/P NEW LOW 30 MIN: CPT | Mod: S$GLB,,, | Performed by: NURSE PRACTITIONER

## 2023-08-28 PROCEDURE — 81003 POCT URINALYSIS, DIPSTICK, AUTOMATED, W/O SCOPE: ICD-10-PCS | Mod: QW,S$GLB,, | Performed by: NURSE PRACTITIONER

## 2023-08-28 PROCEDURE — 81003 URINALYSIS AUTO W/O SCOPE: CPT | Mod: QW,S$GLB,, | Performed by: NURSE PRACTITIONER

## 2023-08-28 NOTE — PROGRESS NOTES
"Subjective:       Patient ID: Tracy Meyer is a 68 y.o. female.    Vitals:  height is 5' 5.5" (1.664 m) and weight is 49 kg (108 lb). Her oral temperature is 98.2 °F (36.8 °C). Her blood pressure is 122/64 and her pulse is 73. Her oxygen saturation is 98%.     Chief Complaint: Dysuria (Dysuria and frequent urination x 3 days.)    This is a 68 y.o. female who presents today with a chief complaint of urinary frequency x 3 days. Patient explains that she drove to [place] recently and was holding her urine for extended period of time.        Dysuria   This is a new problem. The current episode started in the past 7 days. The problem has been gradually worsening. The pain is at a severity of 5/10. The pain is moderate. There has been no fever. Associated symptoms include frequency.       Genitourinary:  Positive for dysuria and frequency.           Objective:      Physical Exam   Constitutional: She is oriented to person, place, and time. normal  HENT:   Head: Normocephalic and atraumatic.   Eyes: Conjunctivae are normal. Extraocular movement intact   Neck: Neck supple.   Cardiovascular: Normal rate, regular rhythm, normal heart sounds and normal pulses.   Pulmonary/Chest: Effort normal and breath sounds normal.   Abdominal: Normal appearance and bowel sounds are normal. She exhibits no distension and no mass. Soft. flat abdomen There is no abdominal tenderness. There is no guarding, no left CVA tenderness and no right CVA tenderness.   Neurological: She is alert and oriented to person, place, and time.   Skin: Skin is warm and dry.   Psychiatric: Her behavior is normal. Mood normal.   Vitals reviewed.        Past medical history and current medications reviewed.       Assessment:           1. Frequent urination              Plan:     Follow up as advised.    Frequent urination  -     POCT Urinalysis, Dipstick, Automated, W/O Scope             There are no Patient Instructions on file for this visit.           Medical " Decision Making:   Differential Diagnosis:   UTI

## 2023-12-05 ENCOUNTER — TELEPHONE (OUTPATIENT)
Dept: DERMATOLOGY | Facility: CLINIC | Age: 68
End: 2023-12-05
Payer: COMMERCIAL

## 2023-12-05 NOTE — TELEPHONE ENCOUNTER
Called patient and got her scheduled.     ----- Message from Yamileth Hammond sent at 12/5/2023 11:28 AM CST -----  Type:  Sooner Appointment Request    Caller is requesting a sooner appointment.  Caller declined first available appointment listed below.  Caller will not accept being placed on the waitlist and is requesting a message be sent to doctor.    Name of Caller:  pt  When is the first available appointment?  N/a  Symptoms:  lesion on nose   Would the patient rather a call back or a response via [facility]? 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 Pt also states that she would like to be seen on Monday 12/11 if possible. Please call back and advise. Thanks!

## 2023-12-12 ENCOUNTER — OFFICE VISIT (OUTPATIENT)
Dept: DERMATOLOGY | Facility: CLINIC | Age: 68
End: 2023-12-12
Payer: MEDICARE

## 2023-12-12 VITALS — HEIGHT: 65 IN | WEIGHT: 108 LBS | BODY MASS INDEX: 17.99 KG/M2

## 2023-12-12 DIAGNOSIS — L57.8 OTHER SKIN CHANGES DUE TO CHRONIC EXPOSURE TO NONIONIZING RADIATION: ICD-10-CM

## 2023-12-12 DIAGNOSIS — Z08 ENCOUNTER FOR FOLLOW-UP SURVEILLANCE OF SKIN CANCER: ICD-10-CM

## 2023-12-12 DIAGNOSIS — D48.5 NEOPLASM OF UNCERTAIN BEHAVIOR OF SKIN: Primary | ICD-10-CM

## 2023-12-12 DIAGNOSIS — Z85.828 ENCOUNTER FOR FOLLOW-UP SURVEILLANCE OF SKIN CANCER: ICD-10-CM

## 2023-12-12 DIAGNOSIS — D22.9 MULTIPLE BENIGN NEVI: ICD-10-CM

## 2023-12-12 DIAGNOSIS — L81.4 SOLAR LENTIGO: ICD-10-CM

## 2023-12-12 PROCEDURE — 88305 TISSUE EXAM BY PATHOLOGIST: ICD-10-PCS | Mod: 26,,, | Performed by: PATHOLOGY

## 2023-12-12 PROCEDURE — 99203 OFFICE O/P NEW LOW 30 MIN: CPT | Mod: 25,S$GLB,, | Performed by: DERMATOLOGY

## 2023-12-12 PROCEDURE — 11102 PR TANGENTIAL BIOPSY, SKIN, SINGLE LESION: ICD-10-PCS | Mod: S$GLB,,, | Performed by: DERMATOLOGY

## 2023-12-12 PROCEDURE — 99203 PR OFFICE/OUTPT VISIT, NEW, LEVL III, 30-44 MIN: ICD-10-PCS | Mod: 25,S$GLB,, | Performed by: DERMATOLOGY

## 2023-12-12 PROCEDURE — 11102 TANGNTL BX SKIN SINGLE LES: CPT | Mod: S$GLB,,, | Performed by: DERMATOLOGY

## 2023-12-12 PROCEDURE — 88305 TISSUE EXAM BY PATHOLOGIST: CPT | Mod: 26,,, | Performed by: PATHOLOGY

## 2023-12-12 PROCEDURE — 88305 TISSUE EXAM BY PATHOLOGIST: CPT | Performed by: PATHOLOGY

## 2023-12-12 NOTE — PROGRESS NOTES
Subjective:      Patient ID:  Tracy Meyer is a 68 y.o. female who presents for   Chief Complaint   Patient presents with    Spot     Nose      New Patient    Patient here today for spot to nose  Pt states spot was bx in 06/2016, came back as a BCC treated by [facility] surgeon.   Pt states spot was treated with LN2, no resolution. Skin cancer returning?  Spot bleeds occasionally.     C/O spot to right eyebrow x 4 months. No symptoms.    Derm HX:  BCC nose- 06/2016 treated with MOHS  Denies Fhx of MM    Current Outpatient Medications:   ·  fluticasone propionate (FLONASE) 50 mcg/actuation nasal spray, 1 spray by Each Nostril route once daily., Disp: , Rfl:   ·  amoxicillin/potassium clav (AUGMENTIN ORAL), Take by mouth., Disp: , Rfl:   ·  GUAIFENESIN ORAL, Take by mouth., Disp: , Rfl:         Review of Systems   Constitutional:  Negative for fever, chills and fatigue.   Skin:  Positive for daily sunscreen use and activity-related sunscreen use.       Objective:   Physical Exam   Constitutional: She appears well-developed and well-nourished.   Neurological: She is alert and oriented to person, place, and time.   Psychiatric: She has a normal mood and affect.   Skin:   Areas Examined (abnormalities noted in diagram):   Scalp / Hair Palpated and Inspected  Head / Face Inspection Performed  Neck Inspection Performed            Diagram Legend     Erythematous scaling macule/papule c/w actinic keratosis       Vascular papule c/w angioma      Pigmented verrucoid papule/plaque c/w seborrheic keratosis      Yellow umbilicated papule c/w sebaceous hyperplasia      Irregularly shaped tan macule c/w lentigo     1-2 mm smooth white papules consistent with Milia      Movable subcutaneous cyst with punctum c/w epidermal inclusion cyst      Subcutaneous movable cyst c/w pilar cyst      Firm pink to brown papule c/w dermatofibroma      Pedunculated fleshy papule(s) c/w skin tag(s)      Evenly pigmented macule c/w junctional nevus      Mildly variegated pigmented, slightly irregular-bordered macule c/w mildly atypical nevus      Flesh colored to evenly pigmented papule c/w intradermal nevus       Pink pearly papule/plaque c/w basal cell carcinoma      Erythematous hyperkeratotic cursted plaque c/w SCC      Surgical scar with no sign of skin cancer recurrence      Open and closed comedones      Inflammatory papules and pustules      Verrucoid papule consistent consistent with wart     Erythematous eczematous patches and plaques     Dystrophic onycholytic nail with subungual debris c/w onychomycosis     Umbilicated papule    Erythematous-base heme-crusted tan verrucoid plaque consistent with inflamed seborrheic keratosis     Erythematous Silvery Scaling Plaque c/w Psoriasis     See annotation      Assessment / Plan:      Pathology Orders:       Normal Orders This Visit    Specimen to Pathology, Dermatology     Questions:    Procedure Type: Dermatology and skin neoplasms    Number of Specimens: 1    ------------------------: -------------------------    Spec 1 Procedure: Biopsy    Spec 1 Clinical Impression: AK vs BCC vs other    Spec 1 Source: nasal dorsum    Release to patient:           Neoplasm of uncertain behavior of skin  -     Specimen to Pathology, Dermatology  Shave biopsy procedure note:    Shave biopsy performed after verbal consent including risk of infection, scar, recurrence, need for additional treatment of site. Area prepped with alcohol, anesthetized with approximately 1.0cc of 1% lidocaine with epinephrine. Lesional tissue shaved with razor blade. Hemostasis achieved with application of aluminum chloride followed by hyfrecation. No complications. Dressing applied. Wound care explained.    Encounter for follow-up surveillance of skin cancer  Area of previous NMSC (nose supratip) examined. Site well healed with no signs of recurrence.  Upper body skin examination performed today including at least 9 points as noted in physical  examination. No lesions suspicious for malignancy noted.    Solar lentigo  This is a benign hyperpigmented sun induced lesion. Daily sun protection will reduce the number of new lesions. Treatment of these benign lesions are considered cosmetic.    Multiple benign nevi  Careful dermoscopy evaluation of nevi performed with none identified as needing biopsy today  Monitor for new mole or moles that are becoming bigger, darker, irritated, or developing irregular borders.     Other skin changes due to chronic exposure to nonionizing radiation  Patient instructed in importance in daily broad spectrum sun protection of at least spf 30. Mineral sunscreen ingredients preferred (Zinc +/- Titanium) and can be found OTC.   Recommend Elta MD for daily use on face and neck.  Patient encouraged to wear hat for all outdoor exposure.   Also discussed sun avoidance and use of protective clothing.             Follow up in about 6 months (around 6/12/2024), or if symptoms worsen or fail to improve.

## 2023-12-21 ENCOUNTER — TELEPHONE (OUTPATIENT)
Dept: DERMATOLOGY | Facility: CLINIC | Age: 68
End: 2023-12-21
Payer: COMMERCIAL

## 2023-12-21 LAB
FINAL PATHOLOGIC DIAGNOSIS: NORMAL
Lab: NORMAL

## 2023-12-21 NOTE — TELEPHONE ENCOUNTER
Attempted to contact patient to review results, no answer, lvm for a return call.    ----- Message from Abby Yanes MD sent at 12/21/2023  4:38 PM CST -----  Lesion was a precancer, biopsy usually curative, FU in 3 months for reevaluation please    RELIAPATH DIAGNOSIS:    SKIN, NASAL DORSUM, SHAVE BIOPSY:  -Actinic keratosis.  -Multiple levels examined.

## 2023-12-22 ENCOUNTER — TELEPHONE (OUTPATIENT)
Dept: DERMATOLOGY | Facility: CLINIC | Age: 68
End: 2023-12-22
Payer: COMMERCIAL

## 2023-12-22 NOTE — TELEPHONE ENCOUNTER
Returned call to patient, results reviewed. Follow up appointment scheduled, no further questions.     ----- Message from Petra Chance sent at 12/22/2023  8:38 AM CST -----  Contact: Self  Type:  Patient Returning Call    Who Called:Pt   Who Left Message for Patient: Naheed APONTE   Does the patient know what this is regarding?:Biopsy results  Would the patient rather a call back or a response via [facility]?  Call   Best Call Back Number:[phone]  Please return call to pt... Thank you...

## 2024-03-25 ENCOUNTER — OFFICE VISIT (OUTPATIENT)
Dept: DERMATOLOGY | Facility: CLINIC | Age: 69
End: 2024-03-25
Payer: MEDICARE

## 2024-03-25 VITALS — HEIGHT: 65 IN | BODY MASS INDEX: 18.37 KG/M2 | WEIGHT: 110.25 LBS

## 2024-03-25 DIAGNOSIS — L81.4 SOLAR LENTIGO: ICD-10-CM

## 2024-03-25 DIAGNOSIS — Z08 ENCOUNTER FOR FOLLOW-UP SURVEILLANCE OF SKIN CANCER: ICD-10-CM

## 2024-03-25 DIAGNOSIS — Z85.828 ENCOUNTER FOR FOLLOW-UP SURVEILLANCE OF SKIN CANCER: ICD-10-CM

## 2024-03-25 DIAGNOSIS — L57.0 ACTINIC KERATOSES: Primary | ICD-10-CM

## 2024-03-25 DIAGNOSIS — L82.1 SEBORRHEIC KERATOSES: ICD-10-CM

## 2024-03-25 PROCEDURE — 99213 OFFICE O/P EST LOW 20 MIN: CPT | Mod: 25,AQ,S$GLB, | Performed by: DERMATOLOGY

## 2024-03-25 PROCEDURE — 17000 DESTRUCT PREMALG LESION: CPT | Mod: AQ,S$GLB,, | Performed by: DERMATOLOGY

## 2024-03-25 PROCEDURE — 17003 DESTRUCT PREMALG LES 2-14: CPT | Mod: AQ,S$GLB,, | Performed by: DERMATOLOGY

## 2024-03-25 NOTE — PATIENT INSTRUCTIONS

## 2024-03-25 NOTE — PROGRESS NOTES
Subjective:      Patient ID:  Tracy ERWIN is a 68 y.o. female who presents for   Chief Complaint   Patient presents with    Lesion     Nasal dorsum     LOV 12/12/23 - NUB, lentigo, nevi    SKIN, NASAL DORSUM, SHAVE BIOPSY:   -Actinic keratosis.   -Multiple levels examined.     Patient here today for 3 month f/u of biopsy site (nasal dorsum)  Pt states she has little raised bump on R side close to site  No further concerns    Derm HX:  BCC nose- 06/2016 treated with [facility]  Denies Fhx of MM    Current Outpatient Medications:   ·  amoxicillin/potassium clav (AUGMENTIN ORAL), Take by mouth., Disp: , Rfl:   ·  fluticasone propionate (FLONASE) 50 mcg/actuation nasal spray, 1 spray by Each Nostril route once daily., Disp: , Rfl:   ·  GUAIFENESIN ORAL, Take by mouth., Disp: , Rfl:             Review of Systems   Constitutional:  Negative for fever, chills and fatigue.   Skin:  Positive for daily sunscreen use, activity-related sunscreen use and wears hat.       Objective:   Physical Exam   Constitutional: She appears well-developed and well-nourished.   Neurological: She is alert and oriented to person, place, and time.   Psychiatric: She has a normal mood and affect.   Skin:   Areas Examined (abnormalities noted in diagram):   Head / Face Inspection Performed  Neck Inspection Performed            Diagram Legend     Erythematous scaling macule/papule c/w actinic keratosis       Vascular papule c/w angioma      Pigmented verrucoid papule/plaque c/w seborrheic keratosis      Yellow umbilicated papule c/w sebaceous hyperplasia      Irregularly shaped tan macule c/w lentigo     1-2 mm smooth white papules consistent with Milia      Movable subcutaneous cyst with punctum c/w epidermal inclusion cyst      Subcutaneous movable cyst c/w pilar cyst      Firm pink to brown papule c/w dermatofibroma      Pedunculated fleshy papule(s) c/w skin tag(s)      Evenly pigmented macule c/w junctional nevus     Mildly variegated pigmented,  slightly irregular-bordered macule c/w mildly atypical nevus      Flesh colored to evenly pigmented papule c/w intradermal nevus       Pink pearly papule/plaque c/w basal cell carcinoma      Erythematous hyperkeratotic cursted plaque c/w SCC      Surgical scar with no sign of skin cancer recurrence      Open and closed comedones      Inflammatory papules and pustules      Verrucoid papule consistent consistent with wart     Erythematous eczematous patches and plaques     Dystrophic onycholytic nail with subungual debris c/w onychomycosis     Umbilicated papule    Erythematous-base heme-crusted tan verrucoid plaque consistent with inflamed seborrheic keratosis     Erythematous Silvery Scaling Plaque c/w Psoriasis     See annotation      Assessment / Plan:        Actinic keratoses  Cryosurgery Procedure Note    Verbal consent from the patient is obtained and the patient is aware of the precancerous quality and need for treatment of these lesions. Liquid nitrogen cryosurgery is applied to the 5 actinic keratoses, as detailed in the physical exam, to produce a freeze injury. The patient is aware that blisters may form and is instructed on wound care with gentle cleansing and use of vaseline ointment to keep moist until healed. The patient is supplied a handout on cryosurgery and is instructed to call if lesions do not completely resolve.    Encounter for follow-up surveillance of skin cancer  Area of previous NMSC (nasal dorsum) examined. Site well healed with no signs of recurrence.  Skin examination performed today as noted in physical examination. No lesions suspicious for malignancy noted.    Seborrheic keratoses  These are benign inherited growths without a malignant potential. Reassurance given to patient. No treatment is necessary.     Solar lentigo  This is a benign hyperpigmented sun induced lesion. Daily sun protection will reduce the number of new lesions. Treatment of these benign lesions are considered  cosmetic.    Patient instructed in importance in daily broad spectrum sun protection of at least spf 30. Mineral sunscreen ingredients preferred (Zinc +/- Titanium) and can be found OTC.   Recommend Elta MD for daily use on face and neck.  Patient encouraged to wear hat for all outdoor exposure.   Also discussed sun avoidance and use of protective clothing.           Follow up in about 4 months (around 7/25/2024).

## 2024-05-15 ENCOUNTER — OFFICE VISIT (OUTPATIENT)
Dept: URGENT CARE | Facility: CLINIC | Age: 69
End: 2024-05-15
Payer: MEDICARE

## 2024-05-15 VITALS
TEMPERATURE: 98 F | HEIGHT: 66 IN | RESPIRATION RATE: 16 BRPM | HEART RATE: 75 BPM | BODY MASS INDEX: 18.18 KG/M2 | DIASTOLIC BLOOD PRESSURE: 75 MMHG | SYSTOLIC BLOOD PRESSURE: 132 MMHG | OXYGEN SATURATION: 97 % | WEIGHT: 113.13 LBS

## 2024-05-15 DIAGNOSIS — B96.89 ACUTE BACTERIAL BRONCHITIS: Primary | ICD-10-CM

## 2024-05-15 DIAGNOSIS — J20.8 ACUTE BACTERIAL BRONCHITIS: Primary | ICD-10-CM

## 2024-05-15 PROCEDURE — 99213 OFFICE O/P EST LOW 20 MIN: CPT | Mod: S$GLB,,, | Performed by: NURSE PRACTITIONER

## 2024-05-15 RX ORDER — ALBUTEROL SULFATE 90 UG/1
2 AEROSOL, METERED RESPIRATORY (INHALATION) EVERY 6 HOURS PRN
Qty: 8.5 G | Refills: 1 | Status: SHIPPED | OUTPATIENT
Start: 2024-05-15

## 2024-05-15 RX ORDER — PROMETHAZINE HYDROCHLORIDE AND DEXTROMETHORPHAN HYDROBROMIDE 6.25; 15 MG/5ML; MG/5ML
5 SYRUP ORAL EVERY 4 HOURS PRN
Qty: 120 ML | Refills: 0 | Status: SHIPPED | OUTPATIENT
Start: 2024-05-15 | End: 2024-05-25

## 2024-05-15 RX ORDER — PREDNISONE 10 MG/1
TABLET ORAL
Qty: 8 TABLET | Refills: 0 | Status: SHIPPED | OUTPATIENT
Start: 2024-05-15

## 2024-05-15 RX ORDER — DOXYCYCLINE 100 MG/1
100 CAPSULE ORAL EVERY 12 HOURS
Qty: 20 CAPSULE | Refills: 0 | Status: SHIPPED | OUTPATIENT
Start: 2024-05-15 | End: 2024-05-25

## 2024-05-15 NOTE — PATIENT INSTRUCTIONS
You must understand that you've received an Urgent Care treatment only and that you may be released before all your medical problems are known or treated. You, the patient, will arrange for follow up care as instructed.  Follow up with your PCP or specialty clinic as directed in the next 1-2 weeks if not improved or as needed.  You can call [phone] to schedule an appointment with the appropriate provider.  If your condition worsens we recommend that you receive another evaluation at the emergency room immediately or contact your primary medical clinics after hours call service to discuss your concerns.  Please return here or go to the Emergency Department for any concerns or worsening of condition.  Please if you smoke please consider quitting. [facility] Smoke cessation hotline number is [phone], available at this number is free counseling and medications to live a healthier life!       If you were prescribed a narcotic or controlled medication, do not drive or operate heavy equipment or machinery while taking these medications.    If you were not prescribed an antibiotic and your not better please return for a recheck. Antibiotic therapy is not always indicated initially.   Please attempt over the counter medications, give it time and try Echinacea, Zinc and Vitamin C to fight common colds and virus.

## 2024-05-15 NOTE — PROGRESS NOTES
"Subjective:       Patient ID: Tracy Quintanilla is a 68 y.o. female.    Vitals:  height is 5' 5.5" (1.664 m) and weight is 51.3 kg (113 lb 1.5 oz). Her oral temperature is 97.7 °F (36.5 °C). Her blood pressure is 132/75 and her pulse is 75. Her respiration is 16 and oxygen saturation is 97%.     Chief Complaint: Cough    This is a 68 y.o. female who presents today with a chief complaint of cough and congestion for about 3 weeks.  Coughing up green.  Taking theraflu, cough drops and albuterol inhaler ('s).  Patient presents with:  Cough         Cough  This is a new problem. The current episode started 1 to 4 weeks ago. The problem has been unchanged. The cough is Productive of sputum. Treatments tried: theraflu, cough drops and albuterol inhaler. The treatment provided mild relief. Her past medical history is significant for asthma.       Respiratory:  Positive for cough.            Objective:      Physical Exam   Constitutional: She is oriented to person, place, and time. She appears well-developed.   HENT:   Head: Normocephalic and atraumatic.   Ears:   Right Ear: External ear normal.   Left Ear: External ear normal.   Nose: Rhinorrhea present.   Mouth/Throat: Mucous membranes are normal. Posterior oropharyngeal erythema present.   Eyes: Conjunctivae and lids are normal.   Neck: Trachea normal. Neck supple.   Cardiovascular: Normal rate, regular rhythm and normal heart sounds.   Pulmonary/Chest: Effort normal and breath sounds normal. No respiratory distress.   Abdominal: Normal appearance and bowel sounds are normal. She exhibits no distension and no mass. Soft. There is no abdominal tenderness.   Musculoskeletal: Normal range of motion.         General: Normal range of motion.   Neurological: She is alert and oriented to person, place, and time. She has normal strength.   Skin: Skin is warm, dry, intact, not diaphoretic and not pale.   Psychiatric: Her speech is normal and behavior is normal. Judgment and " thought content normal.   Nursing note and vitals reviewed.        Past medical history and current medications reviewed.     Hacking cough productive in room,         Assessment:           1. Acute bacterial bronchitis              Plan:         Acute bacterial bronchitis  -     doxycycline (VIBRAMYCIN) 100 MG Cap; Take 1 capsule (100 mg total) by mouth every 12 (twelve) hours. for 10 days  Dispense: 20 capsule; Refill: 0  -     predniSONE (DELTASONE) 10 MG tablet; Take 2 tabs today, then 1 tab po qd x 6 days  Dispense: 8 tablet; Refill: 0  -     promethazine-dextromethorphan (PROMETHAZINE-DM) 6.25-15 mg/5 mL Syrp; Take 5 mLs by mouth every 4 (four) hours as needed.  Dispense: 120 mL; Refill: 0  -     albuterol (VENTOLIN HFA) 90 mcg/actuation inhaler; Inhale 2 puffs into the lungs every 6 (six) hours as needed for Wheezing. Rescue  Dispense: 8.5 g; Refill: 1             Patient Instructions     You must understand that you've received an Urgent Care treatment only and that you may be released before all your medical problems are known or treated. You, the patient, will arrange for follow up care as instructed.  Follow up with your PCP or specialty clinic as directed in the next 1-2 weeks if not improved or as needed.  You can call [phone] to schedule an appointment with the appropriate provider.  If your condition worsens we recommend that you receive another evaluation at the emergency room immediately or contact your primary medical clinics after hours call service to discuss your concerns.  Please return here or go to the Emergency Department for any concerns or worsening of condition.  Please if you smoke please consider quitting. [facility] hotline number is [phone], available at this number is free counseling and medications to live a healthier life!       If you were prescribed a narcotic or controlled medication, do not drive or operate heavy equipment or machinery while taking  these medications.    If you were not prescribed an antibiotic and your not better please return for a recheck. Antibiotic therapy is not always indicated initially.   Please attempt over the counter medications, give it time and try Echinacea, Zinc and Vitamin C to fight common colds and virus.

## 2024-07-24 ENCOUNTER — OFFICE VISIT (OUTPATIENT)
Dept: DERMATOLOGY | Facility: CLINIC | Age: 69
End: 2024-07-24
Payer: MEDICARE

## 2024-07-24 VITALS — HEIGHT: 66 IN | BODY MASS INDEX: 18.18 KG/M2 | WEIGHT: 113.13 LBS

## 2024-07-24 DIAGNOSIS — Z08 ENCOUNTER FOR FOLLOW-UP SURVEILLANCE OF SKIN CANCER: ICD-10-CM

## 2024-07-24 DIAGNOSIS — L82.1 SEBORRHEIC KERATOSES: ICD-10-CM

## 2024-07-24 DIAGNOSIS — Z85.828 ENCOUNTER FOR FOLLOW-UP SURVEILLANCE OF SKIN CANCER: ICD-10-CM

## 2024-07-24 DIAGNOSIS — L57.0 ACTINIC KERATOSES: Primary | ICD-10-CM

## 2024-07-24 DIAGNOSIS — D22.9 MULTIPLE BENIGN NEVI: ICD-10-CM

## 2024-07-24 DIAGNOSIS — L57.8 OTHER SKIN CHANGES DUE TO CHRONIC EXPOSURE TO NONIONIZING RADIATION: ICD-10-CM

## 2024-07-24 DIAGNOSIS — L81.4 SOLAR LENTIGO: ICD-10-CM

## 2024-07-24 PROCEDURE — 17000 DESTRUCT PREMALG LESION: CPT | Mod: AQ,S$GLB,, | Performed by: DERMATOLOGY

## 2024-07-24 PROCEDURE — 99213 OFFICE O/P EST LOW 20 MIN: CPT | Mod: 25,AQ,S$GLB, | Performed by: DERMATOLOGY

## 2024-07-24 NOTE — PROGRESS NOTES
Subjective:      Patient ID:  Tracy Quintanilla is a 68 y.o. female who presents for   Chief Complaint   Patient presents with    Skin Check     TBSE    Spot     chest     LOV: 3/25/24 AK, SK, lentigo  RELIAPATH DIAGNOSIS:     SKIN, NASAL DORSUM, SHAVE BIOPSY:   -Actinic keratosis.   -Multiple levels examined.     Patient here for TBSE    C/o spot on chest  No symptoms     check biopsy site (nasal dorsum)    Derm HX:  BCC nose- 06/2016 treated with MOHS  Denies Fhx of MM      Current Outpatient Medications:   ·  albuterol (VENTOLIN HFA) 90 mcg/actuation inhaler, Inhale 2 puffs into the lungs every 6 (six) hours as needed for Wheezing. Rescue, Disp: 8.5 g, Rfl: 1  ·  fluticasone propionate (FLONASE) 50 mcg/actuation nasal spray, 1 spray by Each Nostril route once daily., Disp: , Rfl:   ·  predniSONE (DELTASONE) 10 MG tablet, Take 2 tabs today, then 1 tab po qd x 6 days, Disp: 8 tablet, Rfl: 0          Review of Systems   Constitutional:  Negative for fever, chills and fatigue.   Skin:  Positive for daily sunscreen use, activity-related sunscreen use and wears hat. Negative for itching, rash and dry skin.   Hematologic/Lymphatic: Bruises/bleeds easily.       Objective:   Physical Exam   Constitutional: She appears well-developed and well-nourished. No distress.   Neurological: She is alert and oriented to person, place, and time. She is not disoriented.   Psychiatric: She has a normal mood and affect.   Skin:   Areas Examined (abnormalities noted in diagram):   Scalp / Hair Palpated and Inspected  Head / Face Inspection Performed  Neck Inspection Performed  Chest / Axilla Inspection Performed  Abdomen Inspection Performed  Genitals / Buttocks / Groin Inspection Performed  Back Inspection Performed  RUE Inspected  LUE Inspection Performed  RLE Inspected  LLE Inspection Performed  Nails and Digits Inspection Performed                 Diagram Legend     Erythematous scaling macule/papule c/w actinic keratosis       Vascular  papule c/w angioma      Pigmented verrucoid papule/plaque c/w seborrheic keratosis      Yellow umbilicated papule c/w sebaceous hyperplasia      Irregularly shaped tan macule c/w lentigo     1-2 mm smooth white papules consistent with Milia      Movable subcutaneous cyst with punctum c/w epidermal inclusion cyst      Subcutaneous movable cyst c/w pilar cyst      Firm pink to brown papule c/w dermatofibroma      Pedunculated fleshy papule(s) c/w skin tag(s)      Evenly pigmented macule c/w junctional nevus     Mildly variegated pigmented, slightly irregular-bordered macule c/w mildly atypical nevus      Flesh colored to evenly pigmented papule c/w intradermal nevus       Pink pearly papule/plaque c/w basal cell carcinoma      Erythematous hyperkeratotic cursted plaque c/w SCC      Surgical scar with no sign of skin cancer recurrence      Open and closed comedones      Inflammatory papules and pustules      Verrucoid papule consistent consistent with wart     Erythematous eczematous patches and plaques     Dystrophic onycholytic nail with subungual debris c/w onychomycosis     Umbilicated papule    Erythematous-base heme-crusted tan verrucoid plaque consistent with inflamed seborrheic keratosis     Erythematous Silvery Scaling Plaque c/w Psoriasis     See annotation      Assessment / Plan:        Actinic keratoses  Cryosurgery Procedure Note    Verbal consent from the patient is obtained and the patient is aware of the precancerous quality and need for treatment of these lesions. Liquid nitrogen cryosurgery is applied to the 1 actinic keratoses, as detailed in the physical exam, to produce a freeze injury. The patient is aware that blisters may form and is instructed on wound care with gentle cleansing and use of vaseline ointment to keep moist until healed. The patient is supplied a handout on cryosurgery and is instructed to call if lesions do not completely resolve.    Encounter for follow-up surveillance of skin  cancer  Area of previous BCC (nose) examined. Site well healed with no signs of recurrence.    Total body skin examination performed today including at least 12 points as noted in physical examination. No lesions suspicious for malignancy noted.    Seborrheic keratoses  These are benign inherited growths without a malignant potential. Reassurance given to patient. No treatment is necessary.     Solar lentigo  This is a benign hyperpigmented sun induced lesion. Daily sun protection will reduce the number of new lesions. Treatment of these benign lesions are considered cosmetic.    Multiple benign nevi  Careful dermoscopy evaluation of nevi performed with none identified as needing biopsy today  Monitor for new mole or moles that are becoming bigger, darker, irritated, or developing irregular borders.     Other skin changes due to chronic exposure to nonionizing radiation  Patient instructed in importance in daily broad spectrum sun protection of at least spf 30. Mineral sunscreen ingredients preferred (Zinc +/- Titanium) and can be found OTC.   Patient encouraged to wear hat for all outdoor exposure.   Also discussed sun avoidance and use of protective clothing.             Follow up in about 1 year (around 7/24/2025).

## 2024-07-24 NOTE — PATIENT INSTRUCTIONS

## 2024-10-25 ENCOUNTER — OFFICE VISIT (OUTPATIENT)
Dept: URGENT CARE | Facility: CLINIC | Age: 69
End: 2024-10-25
Payer: MEDICARE

## 2024-10-25 VITALS
WEIGHT: 119 LBS | TEMPERATURE: 98 F | HEART RATE: 78 BPM | SYSTOLIC BLOOD PRESSURE: 140 MMHG | HEIGHT: 66 IN | DIASTOLIC BLOOD PRESSURE: 75 MMHG | BODY MASS INDEX: 19.13 KG/M2 | RESPIRATION RATE: 16 BRPM | OXYGEN SATURATION: 99 %
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
## 2024-10-25 RX ORDER — DOXYCYCLINE 100 MG/1
100 CAPSULE ORAL EVERY 12 HOURS
Qty: 20 CAPSULE | Refills: 0 | Status: SHIPPED | OUTPATIENT
Start: 2024-10-25 | End: 2024-11-04

## 2024-10-25 RX ORDER — PREDNISONE 10 MG/1
10 TABLET ORAL DAILY
Qty: 5 TABLET | Refills: 0 | Status: SHIPPED | OUTPATIENT
Start: 2024-10-25 | End: 2024-10-30

## 2024-10-25 NOTE — PROGRESS NOTES
"Subjective:      Patient ID: Tracy Quintanilla is a 69 y.o. female.    Vitals:  height is 5' 6" (1.676 m) and weight is 54 kg (119 lb). Her oral temperature is 98.1 °F (36.7 °C). Her blood pressure is 140/75 (abnormal) and her pulse is 78. Her respiration is 16 and oxygen saturation is 99%.     Chief Complaint: Insect Bite    68yo afebrile female who reports insect bite to left forearm.    Insect Bite  This is a new problem. The current episode started in the past 7 days. The problem occurs constantly. Nothing aggravates the symptoms. She has tried ice (benadryl, cortisone cream) for the symptoms. The treatment provided mild relief.       Skin:  Positive for erythema.      Objective:     Physical Exam   Constitutional: She is oriented to person, place, and time.  Non-toxic appearance. She does not appear ill. No distress. normal  HENT:   Head: Normocephalic and atraumatic.   Nose: Nose normal.   Mouth/Throat: Mucous membranes are moist. Oropharynx is clear.   Eyes: Conjunctivae are normal. Extraocular movement intact   Neck: Neck supple. No neck rigidity present.   Cardiovascular: Normal rate, regular rhythm, normal heart sounds and normal pulses.   Pulmonary/Chest: Effort normal and breath sounds normal.   Abdominal: Normal appearance.   Musculoskeletal: Normal range of motion.         General: Normal range of motion.      Cervical back: She exhibits no tenderness.   Lymphadenopathy:     She has no cervical adenopathy.   Neurological: She is alert and oriented to person, place, and time.   Skin: Skin is warm, dry and not diaphoretic. erythema         Comments: There is a small (0.5cm in diameter) area of localized erythema with very small central puncture wound to the left forearm. It is consistent in appearance with an insect bite.   Psychiatric: Her behavior is normal.   Vitals reviewed.      Assessment:     1. Insect bite, unspecified site, initial encounter        Plan:       Insect bite, unspecified site, initial " encounter  -     doxycycline (VIBRAMYCIN) 100 MG Cap; Take 1 capsule (100 mg total) by mouth every 12 (twelve) hours. for 10 days  Dispense: 20 capsule; Refill: 0  -     predniSONE (DELTASONE) 10 MG tablet; Take 1 tablet (10 mg total) by mouth once daily. for 5 days  Dispense: 5 tablet; Refill: 0    INSTRUCTIONS  Meds as prescribed. Follow up as advised.

## 2025-01-05 ENCOUNTER — PATIENT MESSAGE (OUTPATIENT)
Dept: DERMATOLOGY | Facility: CLINIC | Age: 70
End: 2025-01-05
Payer: COMMERCIAL

## 2025-01-14 ENCOUNTER — OFFICE VISIT (OUTPATIENT)
Dept: PODIATRY | Facility: CLINIC | Age: 70
End: 2025-01-14
Payer: MEDICARE

## 2025-01-14 VITALS — WEIGHT: 120 LBS | HEIGHT: 66 IN | BODY MASS INDEX: 19.29 KG/M2

## 2025-01-14 DIAGNOSIS — S90.122A CONTUSION OF LESSER TOE OF LEFT FOOT WITHOUT DAMAGE TO NAIL, INITIAL ENCOUNTER: Primary | ICD-10-CM

## 2025-01-14 DIAGNOSIS — M79.672 LEFT FOOT PAIN: ICD-10-CM

## 2025-01-14 DIAGNOSIS — R26.2 DIFFICULTY WALKING: ICD-10-CM

## 2025-01-14 NOTE — PROGRESS NOTES
Subjective:     Patient ID: Tracy Quintanilla is a 69 y.o. female    Chief Complaint: Foot Pain       Tracy is a 69 y.o. female who presents to the podiatry clinic  with complaint of  left foot pain. Onset of the symptoms was several months ago. Precipitating event: injured left 2nd digit via trauma against side of pool and door jam and increased activity. Current symptoms include: ability to bear weight, but with some pain, stiffness, and worsening symptoms after a period of activity. Aggravating factors:  Prolonged running . Symptoms have progressed to a point and plateaued. Patient has had prior foot problems. Treatment to date: OTC analgesics which are somewhat effective. Patients rates pain 4/10 on pain scale.    Review of Systems   Constitutional: Negative.  Negative for chills and fever.   Respiratory:  Negative for cough and shortness of breath.    Cardiovascular:  Negative for chest pain and leg swelling.   Gastrointestinal:  Negative for diarrhea, nausea and vomiting.   Neurological:  Negative for tingling.        Objective:   Physical Exam  Vitals reviewed.   Constitutional:       General: She is not in acute distress.     Appearance: Normal appearance. She is not ill-appearing.   HENT:      Head: Normocephalic.      Nose: Nose normal.   Cardiovascular:      Pulses:           Dorsalis pedis pulses are 2+ on the right side and 2+ on the left side.        Posterior tibial pulses are 2+ on the right side and 2+ on the left side.   Pulmonary:      Effort: Pulmonary effort is normal. No respiratory distress.   Musculoskeletal:      Right foot: No bunion.      Left foot: No bunion.   Skin:     Capillary Refill: Capillary refill takes 2 to 3 seconds.   Neurological:      Mental Status: She is alert and oriented to person, place, and time.   Psychiatric:         Mood and Affect: Mood normal.         Behavior: Behavior normal.         Thought Content: Thought content normal.         Judgment: Judgment normal.         Foot Exam    General  General Appearance: appears stated age and healthy   Orientation: alert and oriented to person, place, and time   Affect: appropriate   Gait: unimpaired       Right Foot/Ankle     Inspection and Palpation  Hallux valgus: no  Skin Exam: skin intact;     Neurovascular  Dorsalis pedis: 2+  Posterior tibial: 2+  Saphenous nerve sensation: normal  Tibial nerve sensation: normal  Superficial peroneal nerve sensation: normal  Deep peroneal nerve sensation: normal  Sural nerve sensation: normal    Muscle Strength  Ankle dorsiflexion: 5  Ankle plantar flexion: 5  Ankle inversion: 5  Ankle eversion: 5  Great toe extension: 5  Great toe flexion: 5      Left Foot/Ankle      Inspection and Palpation  Hammer toes: Rigidly rectus left 2nd digit.  Hallux valgus: no  Skin Exam: skin intact;     Neurovascular  Dorsalis pedis: 2+  Posterior tibial: 2+  Saphenous nerve sensation: normal  Tibial nerve sensation: normal  Superficial peroneal nerve sensation: normal  Deep peroneal nerve sensation: normal  Sural nerve sensation: normal    Muscle Strength  Ankle dorsiflexion: 5  Ankle plantar flexion: 5  Ankle inversion: 5  Ankle eversion: 5  Great toe extension: 5  Great toe flexion: 5      Dermatologic:  Right 3rd digit nail plate is thick and discolored with subungual debris    Assessment:         1. Contusion of lesser toe of left foot without damage to nail, initial encounter    2. Left foot pain    3. Difficulty walking       Plan:     Trayc RADHA Quintanilla was seen today for   Chief Complaint   Patient presents with    Foot Pain       Assessment & Plan           Procedures     1. Patient was examined and evaluated.    2. Discussed with patient prior possible micro trauma to distal tip of the left 2nd digit and prior left 2nd digit contusion.  Patient made aware that she has a rigidly rectus left 2nd digit which is potentially causing irritation against the front of the shoe while she is doing extensive running.  Patient  runs at least 10 miles a day.  Patient was advised to ice and elevate the left lower extremity end of days in consider decreased amount of total activity.  Patient was also encouraged to alter shoe gear for better comfort and fit.  Discussed with patient potential benefits of local corticosteroid injection or oral Medrol Dosepak should pain complaints persist or worsen over time.  3. Discussed with patient the etiology of nail fungus and as possible secondary issue to prior nail trauma.  Discussed with patient OTC topical conservative treatments such as Vicks vapor rub, tea tree oil as well as coconut oil.  Discussed with patient risks and benefits oral Lamisil therapy.   4. Patient will follow up p.r.n. for complaints       Basil Valentine DPM  [id number] [address], [place] [address]  [phone]      This note was created using mi[employer] direct voice recognition software. Note may have occasional typographical errors that may not have been identified and edited despite initial review prior to signing.

## 2025-01-20 ENCOUNTER — PATIENT MESSAGE (OUTPATIENT)
Dept: DERMATOLOGY | Facility: CLINIC | Age: 70
End: 2025-01-20
Payer: COMMERCIAL

## 2025-02-04 ENCOUNTER — NURSE TRIAGE (OUTPATIENT)
Dept: ADMINISTRATIVE | Facility: CLINIC | Age: 70
End: 2025-02-04
Payer: COMMERCIAL

## 2025-02-04 NOTE — TELEPHONE ENCOUNTER
Pt calling with c/o 2nd toe pain on the left foot and injuried it back in August and she never went to see about it and just buddy taped it to the other for a month and she saw Podiatrist and he didn't take any xrays but she said that it really hasn't improved any. Pt concerned and she said that she walks about 10 miles a day usually and hasn't been able to do so and triaged and care advice to be seen within 3 days and is wanting to see ortho. Pt passed over to  to schedule appt and pt would like to start seeing Dr Ott as that's who her  see's and she doesn't have [facility] provider. I will send message to MD to see if they can get her set up for established care. Pt to call back as needed                          Reason for Disposition   MODERATE pain (e.g., interferes with normal activities, limping) and present > 3 days    Additional Information   Negative: Foot is cool or blue in comparison to other foot   Negative: Purple or black skin on toe  (Exception: Person remembers bruising the toe from an injury.)   Negative: Patient sounds very sick or weak to the triager   Negative: SEVERE pain (e.g., excruciating, unable to do any normal activities) and not improved after 2 hours of pain medicine   Negative: Looks like a boil, infected sore, deep ulcer, or other infected rash (spreading redness, pus)   Negative: Yellow pus seen in skin around toenail (cuticle area), or pus seen under toenail   Negative: Numbness in one foot (i.e., loss of sensation)    Protocols used: Toe Pain-A-OH

## 2025-02-05 DIAGNOSIS — M79.672 LEFT FOOT PAIN: Primary | ICD-10-CM

## 2025-02-06 ENCOUNTER — OFFICE VISIT (OUTPATIENT)
Dept: DERMATOLOGY | Facility: CLINIC | Age: 70
End: 2025-02-06
Payer: MEDICARE

## 2025-02-06 VITALS — BODY MASS INDEX: 19.27 KG/M2 | HEIGHT: 66 IN | WEIGHT: 119.94 LBS

## 2025-02-06 DIAGNOSIS — L81.4 SOLAR LENTIGO: ICD-10-CM

## 2025-02-06 DIAGNOSIS — L82.1 SEBORRHEIC KERATOSES: ICD-10-CM

## 2025-02-06 DIAGNOSIS — L57.8 ACTINIC SKIN DAMAGE: ICD-10-CM

## 2025-02-06 DIAGNOSIS — Z08 ENCOUNTER FOR FOLLOW-UP SURVEILLANCE OF SKIN CANCER: ICD-10-CM

## 2025-02-06 DIAGNOSIS — D22.9 MULTIPLE BENIGN NEVI: ICD-10-CM

## 2025-02-06 DIAGNOSIS — L57.0 ACTINIC KERATOSES: Primary | ICD-10-CM

## 2025-02-06 DIAGNOSIS — Z85.828 ENCOUNTER FOR FOLLOW-UP SURVEILLANCE OF SKIN CANCER: ICD-10-CM

## 2025-02-06 PROCEDURE — 99213 OFFICE O/P EST LOW 20 MIN: CPT | Mod: 25,S$GLB,, | Performed by: DERMATOLOGY

## 2025-02-06 PROCEDURE — 17000 DESTRUCT PREMALG LESION: CPT | Mod: S$GLB,,, | Performed by: DERMATOLOGY

## 2025-02-06 PROCEDURE — 17003 DESTRUCT PREMALG LES 2-14: CPT | Mod: S$GLB,,, | Performed by: DERMATOLOGY

## 2025-02-06 NOTE — PROGRESS NOTES
Subjective:      Patient ID:  Tracy Quintanilla is a 69 y.o. female who presents for   Chief Complaint   Patient presents with    Spot     Nose, right brow     LOV: 7/24/24 - SK, lentigo, nevi    Skin Check - FACE    C/o spot on nose  C/o spot on right brow    Derm HX:  BCC nose- 06/2016 treated with MOHS  Denies Fhx of MM    Current Outpatient Medications:   ·  albuterol (VENTOLIN HFA) 90 mcg/actuation inhaler, Inhale 2 puffs into the lungs every 6 (six) hours as needed for Wheezing. Rescue (Patient not taking: Reported on 10/25/2024), Disp: 8.5 g, Rfl: 1  ·  fluticasone propionate (FLONASE) 50 mcg/actuation nasal spray, 1 spray by Each Nostril route once daily. (Patient not taking: Reported on 10/25/2024), Disp: , Rfl:         Review of Systems   Constitutional:  Negative for fever, chills and fatigue.   Skin:  Positive for daily sunscreen use, activity-related sunscreen use and wears hat. Negative for itching, rash and dry skin.   Hematologic/Lymphatic: Bruises/bleeds easily.       Objective:   Physical Exam   Constitutional: She appears well-developed and well-nourished. No distress.   Neurological: She is alert and oriented to person, place, and time. She is not disoriented.   Psychiatric: She has a normal mood and affect.   Skin:   Areas Examined (abnormalities noted in diagram):   Head / Face Inspection Performed                 Diagram Legend     Erythematous scaling macule/papule c/w actinic keratosis       Vascular papule c/w angioma      Pigmented verrucoid papule/plaque c/w seborrheic keratosis      Yellow umbilicated papule c/w sebaceous hyperplasia      Irregularly shaped tan macule c/w lentigo     1-2 mm smooth white papules consistent with Milia      Movable subcutaneous cyst with punctum c/w epidermal inclusion cyst      Subcutaneous movable cyst c/w pilar cyst      Firm pink to brown papule c/w dermatofibroma      Pedunculated fleshy papule(s) c/w skin tag(s)      Evenly pigmented macule c/w junctional  nevus     Mildly variegated pigmented, slightly irregular-bordered macule c/w mildly atypical nevus      Flesh colored to evenly pigmented papule c/w intradermal nevus       Pink pearly papule/plaque c/w basal cell carcinoma      Erythematous hyperkeratotic cursted plaque c/w SCC      Surgical scar with no sign of skin cancer recurrence      Open and closed comedones      Inflammatory papules and pustules      Verrucoid papule consistent consistent with wart     Erythematous eczematous patches and plaques     Dystrophic onycholytic nail with subungual debris c/w onychomycosis     Umbilicated papule    Erythematous-base heme-crusted tan verrucoid plaque consistent with inflamed seborrheic keratosis     Erythematous Silvery Scaling Plaque c/w Psoriasis     See annotation      Assessment / Plan:        Actinic keratoses  Cryosurgery Procedure Note    Verbal consent from the patient is obtained and the patient is aware of the precancerous quality and need for treatment of these lesions. Liquid nitrogen cryosurgery is applied to the 7 actinic keratoses, as detailed in the physical exam, to produce a freeze injury. The patient is aware that blisters may form and is instructed on wound care with gentle cleansing and use of vaseline ointment to keep moist until healed. The patient is supplied a handout on cryosurgery and is instructed to call if lesions do not completely resolve.    Encounter for follow-up surveillance of skin cancer  Area of previous NMSC (nasal dorsum) examined. Site well healed with no signs of recurrence.  Facial skin examination performed today as noted in physical examination (at patient's request). No lesions suspicious for malignancy noted.    Seborrheic keratoses  These are benign inherited growths without a malignant potential. Reassurance given to patient. No treatment is necessary.     Solar lentigo  This is a benign hyperpigmented sun induced lesion. Daily sun protection will reduce the number  of new lesions. Treatment of these benign lesions are considered cosmetic.    Multiple benign nevi  Careful dermoscopy evaluation of nevi performed with none identified as needing biopsy today  Monitor for new mole or moles that are becoming bigger, darker, irritated, or developing irregular borders.     Actinic skin damage  Patient instructed in importance in daily broad spectrum sun protection of at least spf 30. Mineral sunscreen ingredients preferred (Zinc +/- Titanium) and can be found OTC.   Patient encouraged to wear hat for all outdoor exposure.   Also discussed sun avoidance and use of protective clothing.             No follow-ups on file.

## 2025-02-07 ENCOUNTER — OFFICE VISIT (OUTPATIENT)
Dept: ORTHOPEDICS | Facility: CLINIC | Age: 70
End: 2025-02-07
Payer: MEDICARE

## 2025-02-07 ENCOUNTER — HOSPITAL ENCOUNTER (OUTPATIENT)
Dept: RADIOLOGY | Facility: HOSPITAL | Age: 70
Discharge: HOME | End: 2025-02-07
Attending: ORTHOPAEDIC SURGERY
Payer: MEDICARE

## 2025-02-07 VITALS — BODY MASS INDEX: 19.27 KG/M2 | WEIGHT: 119.94 LBS | HEIGHT: 66 IN

## 2025-02-07 DIAGNOSIS — M79.672 LEFT FOOT PAIN: ICD-10-CM

## 2025-02-07 DIAGNOSIS — S93.515A: Primary | ICD-10-CM

## 2025-02-07 PROCEDURE — 99999 PR PBB SHADOW E&M-EST. PATIENT-LVL II: CPT | Mod: PBBFAC,,, | Performed by: ORTHOPAEDIC SURGERY

## 2025-02-07 PROCEDURE — 73630 X-RAY EXAM OF FOOT: CPT | Mod: 26,LT,, | Performed by: RADIOLOGY

## 2025-02-07 PROCEDURE — 99203 OFFICE O/P NEW LOW 30 MIN: CPT | Mod: S$PBB,,, | Performed by: ORTHOPAEDIC SURGERY

## 2025-02-07 PROCEDURE — 73630 X-RAY EXAM OF FOOT: CPT | Mod: TC,PO,LT

## 2025-02-07 PROCEDURE — 99212 OFFICE O/P EST SF 10 MIN: CPT | Mod: PBBFAC,25,PO | Performed by: ORTHOPAEDIC SURGERY

## 2025-02-07 NOTE — PROGRESS NOTES
Status/Diagnosis: Left 2nd toe DIPJ sprain; bone contusion.  Date of Surgery: none  Date of Injury: September 2024  Return visit: PRN  X-rays on Return: pending patient complaint    Chief Complaint: left foot pain    Present History:  Patient presents today via referral from [facility]   Tracy Quintanilla is a 69 y.o. female with complaints of left foot pain for the past 5 months.  She reports hitting her 2nd toe on the concrete when exiting a swimming pool.  Most of her pain is at the distal aspect of the second toe.  This is worse with walking and pushing off.  She states there has been swelling and redness.  At baseline she is very active and likes to walk and hike about 10 miles per day.  She has tried decreasing her walking but symptoms have not improved much. Over the past few weeks she has noticed some pain in the forefoot and arch.    She did see podiatry for this about 3 weeks ago but did not have any other treatment from this visit.  She also had treatment for a peroneal tendon tear of the right ankle in 2021.  An MRI was done in [place] to diagnose this at the time but she never had any surgery on her ankle.  She has noticed recently that she is having some pain along the lateral aspect of the right ankle radiating up to her hip.  This started after several days of wearing different shoes.        Past Medical History:   Diagnosis Date    Asthma     Basal cell carcinoma        Past Surgical History:   Procedure Laterality Date    APPENDECTOMY      CHOLECYSTECTOMY         Current Outpatient Medications   Medication Sig    albuterol (VENTOLIN HFA) 90 mcg/actuation inhaler Inhale 2 puffs into the lungs every 6 (six) hours as needed for Wheezing. Rescue (Patient not taking: Reported on 10/25/2024)    fluticasone propionate (FLONASE) 50 mcg/actuation nasal spray 1 spray by Each Nostril route once daily. (Patient not taking: Reported on 10/25/2024)     No current facility-administered medications for this  visit.       Review of patient's allergies indicates:   Allergen Reactions    Lifitegrast      Other Reaction(s): Irritation    Patient's eyes were very painful and irritated when taking this medication.    Mupirocin Rash and Other (See Comments)    Pneumococcal 23-valent polysaccharide vaccine      Other Reaction(s): Persistent redness of skin, Swelling of right arm       No family history on file.    Social History     Socioeconomic History    Marital status:    Tobacco Use    Smoking status: Never     Passive exposure: Past    Smokeless tobacco: Never   Substance and Sexual Activity    Alcohol use: Yes     Comment: occ    Drug use: Never    Sexual activity: Yes     Birth control/protection: Post-menopausal     Social Drivers of Health     Financial Resource Strain: Low Risk  (1/31/2025)    Overall Financial Resource Strain (CARDIA)     Difficulty of Paying Living Expenses: Not hard at all   Food Insecurity: No Food Insecurity (1/31/2025)    Hunger Vital Sign     Worried About Running Out of Food in the Last Year: Never true     Ran Out of Food in the Last Year: Never true   Physical Activity: Sufficiently Active (1/31/2025)    Exercise Vital Sign     Days of Exercise per Week: 7 days     Minutes of Exercise per Session: 100 min   Stress: No Stress Concern Present (1/31/2025)    [employer] [place] [employer] [facility] - Occupational Stress Questionnaire     Feeling of Stress : Not at all   Housing Stability: Unknown (1/31/2025)    Housing Stability Vital Sign     Unable to Pay for Housing in the Last Year: No       Physical exam:  There were no vitals filed for this visit.  Body mass index is 19.36 kg/m².  General: In no apparent distress; well developed and well nourished.  HEENT: normocephalic; atraumatic.  Cardiovascular: regular rate.  Respiratory: no increased work of breathing.  Musculoskeletal:   Gait: nonantalgic  Inspection:   Skin intact without any abrasions or erythema.  There is no obvious  deformity.  She has tenderness to the DIP joint of the 2nd toe.  Some limitation in active motion but none passively.  Silfverskiold: Negative  Alignment:  Knee: neutral               Ankle: neutral              Hindfoot: neutral              Forefoot: neutral   Strength:              Dorsiflexion 5/5  Plantar flexion 5/5  Inversion 5/5  Eversion 5/5  Sensation:              SILT distally  ROM:              Ankle: full and painless              Subtalar: near full with pain at extremes  Pulses: Palpable pedal pulse                Right ankle  No significant swelling or gross abnormality noted  She has mild TTP along peroneals  FROM in ankle joint and 5/5 strength testing  Gross sensory and motor function intact    Imaging Studies/Outside documentation:  I have ordered/reviewed/interpreted the following images/outside documentation:  1. Weight-bearing 3-views of Left foot:   On my independent review, no acute bony abnormality noted.  Calcaneal pitch and talonavicular uncoverage within normal limits.  No evidence of DIP joint subluxation on the lateral view.        Assessment:  Tracy Quintanilla is a 69 y.o. female with Left 2nd toe DIPJ sprain; bone contusion.     Plan:   Clinical and radiographic findings were discussed  Carbon fiber shoe insert handout provided today  Discussed shoe wear. Resume low impact exercise as tolerated  She may follow up in clinic if symptoms worsen or fail to improve    This note was created using voice recognition software and may contain grammatical errors.